# Patient Record
Sex: MALE | Race: BLACK OR AFRICAN AMERICAN | NOT HISPANIC OR LATINO | URBAN - METROPOLITAN AREA
[De-identification: names, ages, dates, MRNs, and addresses within clinical notes are randomized per-mention and may not be internally consistent; named-entity substitution may affect disease eponyms.]

---

## 2017-02-06 ENCOUNTER — EMERGENCY (EMERGENCY)
Facility: HOSPITAL | Age: 62
LOS: 0 days | Discharge: HOME | End: 2017-02-06
Attending: EMERGENCY MEDICINE
Payer: COMMERCIAL

## 2017-02-06 VITALS
OXYGEN SATURATION: 98 % | HEART RATE: 74 BPM | RESPIRATION RATE: 16 BRPM | TEMPERATURE: 98 F | DIASTOLIC BLOOD PRESSURE: 109 MMHG | SYSTOLIC BLOOD PRESSURE: 166 MMHG

## 2017-02-06 VITALS
DIASTOLIC BLOOD PRESSURE: 120 MMHG | WEIGHT: 130.07 LBS | TEMPERATURE: 98 F | OXYGEN SATURATION: 100 % | RESPIRATION RATE: 16 BRPM | HEART RATE: 85 BPM | SYSTOLIC BLOOD PRESSURE: 179 MMHG | HEIGHT: 65 IN

## 2017-02-06 DIAGNOSIS — E11.9 TYPE 2 DIABETES MELLITUS WITHOUT COMPLICATIONS: ICD-10-CM

## 2017-02-06 DIAGNOSIS — S13.9XXA SPRAIN OF JOINTS AND LIGAMENTS OF UNSPECIFIED PARTS OF NECK, INITIAL ENCOUNTER: ICD-10-CM

## 2017-02-06 DIAGNOSIS — I10 ESSENTIAL (PRIMARY) HYPERTENSION: ICD-10-CM
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
## 2017-02-06 DIAGNOSIS — R51 HEADACHE: ICD-10-CM
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
## 2017-02-06 DIAGNOSIS — M54.2 CERVICALGIA: ICD-10-CM

## 2017-02-06 PROCEDURE — 99283 EMERGENCY DEPT VISIT LOW MDM: CPT

## 2017-02-06 RX ORDER — IBUPROFEN 200 MG
1 TABLET ORAL
Qty: 12 | Refills: 1 | OUTPATIENT
Start: 2017-02-06

## 2017-02-06 RX ORDER — ACETAMINOPHEN 500 MG
975 TABLET ORAL ONCE
Qty: 0 | Refills: 0 | Status: COMPLETED | OUTPATIENT
Start: 2017-02-06 | End: 2017-02-06

## 2017-02-06 RX ORDER — IBUPROFEN 200 MG
600 TABLET ORAL ONCE
Qty: 0 | Refills: 0 | Status: COMPLETED | OUTPATIENT
Start: 2017-02-06 | End: 2017-02-06

## 2017-02-06 RX ORDER — LABETALOL HCL 100 MG
100 TABLET ORAL ONCE
Qty: 0 | Refills: 0 | Status: COMPLETED | OUTPATIENT
Start: 2017-02-06 | End: 2017-02-06

## 2017-02-06 RX ORDER — ACETAMINOPHEN 500 MG
2 TABLET ORAL
Qty: 24 | Refills: 1 | OUTPATIENT
Start: 2017-02-06

## 2017-02-06 RX ADMIN — Medication 975 MILLIGRAM(S): at 18:03

## 2017-02-06 RX ADMIN — Medication 600 MILLIGRAM(S): at 18:04

## 2017-02-06 RX ADMIN — Medication 100 MILLIGRAM(S): at 18:03
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
## 2017-02-06 NOTE — ED PROVIDER NOTE - MEDICAL DECISION MAKING DETAILS
Pt well appearing, no midline tenderness, only mild HA, no other complaints.  No imaging currently indicated in ED.  Given labetalol, motrin, tylenol.  Feeling better with improved BP.  f/u ortho.
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
## 2017-02-23 ENCOUNTER — APPOINTMENT (OUTPATIENT)
Dept: ENDOCRINOLOGY | Facility: CLINIC | Age: 62
End: 2017-02-23

## 2017-02-23 VITALS
OXYGEN SATURATION: 97 % | SYSTOLIC BLOOD PRESSURE: 150 MMHG | WEIGHT: 152 LBS | DIASTOLIC BLOOD PRESSURE: 90 MMHG | BODY MASS INDEX: 25.33 KG/M2 | HEIGHT: 65 IN | HEART RATE: 74 BPM

## 2017-02-23 LAB
GLUCOSE BLDC GLUCOMTR-MCNC: 207
HBA1C MFR BLD HPLC: 11.5

## 2017-02-24 LAB
25(OH)D3 SERPL-MCNC: 11.8 NG/ML
ANION GAP SERPL CALC-SCNC: 20 MMOL/L
BUN SERPL-MCNC: 13 MG/DL
CALCIUM SERPL-MCNC: 9.6 MG/DL
CHLORIDE SERPL-SCNC: 101 MMOL/L
CO2 SERPL-SCNC: 24 MMOL/L
CREAT SERPL-MCNC: 1.11 MG/DL
GLUCOSE SERPL-MCNC: 216 MG/DL
POTASSIUM SERPL-SCNC: 4.6 MMOL/L
SODIUM SERPL-SCNC: 144 MMOL/L

## 2017-04-07 ENCOUNTER — APPOINTMENT (OUTPATIENT)
Dept: ENDOCRINOLOGY | Facility: CLINIC | Age: 62
End: 2017-04-07

## 2017-05-01 ENCOUNTER — MEDICATION RENEWAL (OUTPATIENT)
Age: 62
End: 2017-05-01

## 2017-09-05 ENCOUNTER — APPOINTMENT (OUTPATIENT)
Dept: ENDOCRINOLOGY | Facility: CLINIC | Age: 62
End: 2017-09-05
Payer: COMMERCIAL

## 2017-09-05 VITALS
HEART RATE: 71 BPM | HEIGHT: 65 IN | BODY MASS INDEX: 24.32 KG/M2 | SYSTOLIC BLOOD PRESSURE: 140 MMHG | DIASTOLIC BLOOD PRESSURE: 84 MMHG | OXYGEN SATURATION: 98 % | WEIGHT: 146 LBS

## 2017-09-05 LAB
GLUCOSE BLDC GLUCOMTR-MCNC: 252
HBA1C MFR BLD HPLC: 13.6

## 2017-09-05 PROCEDURE — 83036 HEMOGLOBIN GLYCOSYLATED A1C: CPT | Mod: QW

## 2017-09-05 PROCEDURE — 82962 GLUCOSE BLOOD TEST: CPT

## 2017-09-05 PROCEDURE — 99215 OFFICE O/P EST HI 40 MIN: CPT | Mod: 25

## 2017-09-06 LAB
25(OH)D3 SERPL-MCNC: 20.8 NG/ML
ALBUMIN SERPL ELPH-MCNC: 4.6 G/DL
ALP BLD-CCNC: 128 U/L
ALT SERPL-CCNC: 19 U/L
ANION GAP SERPL CALC-SCNC: 16 MMOL/L
AST SERPL-CCNC: 19 U/L
BILIRUB SERPL-MCNC: 0.9 MG/DL
BUN SERPL-MCNC: 13 MG/DL
CALCIUM SERPL-MCNC: 10 MG/DL
CHLORIDE SERPL-SCNC: 95 MMOL/L
CHOLEST SERPL-MCNC: 210 MG/DL
CHOLEST/HDLC SERPL: 3.3 RATIO
CO2 SERPL-SCNC: 26 MMOL/L
CREAT SERPL-MCNC: 1.12 MG/DL
CREAT SPEC-SCNC: 143 MG/DL
GLUCOSE SERPL-MCNC: 246 MG/DL
HDLC SERPL-MCNC: 63 MG/DL
LDLC SERPL CALC-MCNC: 128 MG/DL
MICROALBUMIN 24H UR DL<=1MG/L-MCNC: 4.3 MG/DL
MICROALBUMIN/CREAT 24H UR-RTO: 30 MG/G
POTASSIUM SERPL-SCNC: 4.6 MMOL/L
PROT SERPL-MCNC: 7.5 G/DL
SODIUM SERPL-SCNC: 137 MMOL/L
TRIGL SERPL-MCNC: 97 MG/DL

## 2017-10-23 ENCOUNTER — APPOINTMENT (OUTPATIENT)
Dept: ENDOCRINOLOGY | Facility: CLINIC | Age: 62
End: 2017-10-23

## 2017-11-30 ENCOUNTER — RX RENEWAL (OUTPATIENT)
Age: 62
End: 2017-11-30

## 2017-12-12 ENCOUNTER — APPOINTMENT (OUTPATIENT)
Dept: ENDOCRINOLOGY | Facility: CLINIC | Age: 62
End: 2017-12-12
Payer: COMMERCIAL

## 2017-12-12 VITALS
HEIGHT: 65 IN | SYSTOLIC BLOOD PRESSURE: 130 MMHG | BODY MASS INDEX: 24.66 KG/M2 | WEIGHT: 148 LBS | DIASTOLIC BLOOD PRESSURE: 74 MMHG | OXYGEN SATURATION: 98 % | HEART RATE: 69 BPM

## 2017-12-12 PROCEDURE — 99215 OFFICE O/P EST HI 40 MIN: CPT

## 2017-12-12 PROCEDURE — 95250 CONT GLUC MNTR PHYS/QHP EQP: CPT

## 2018-01-31 ENCOUNTER — RX RENEWAL (OUTPATIENT)
Age: 63
End: 2018-01-31

## 2018-02-12 ENCOUNTER — APPOINTMENT (OUTPATIENT)
Dept: ENDOCRINOLOGY | Facility: CLINIC | Age: 63
End: 2018-02-12
Payer: COMMERCIAL

## 2018-02-12 PROCEDURE — 95251 CONT GLUC MNTR ANALYSIS I&R: CPT

## 2018-02-12 PROCEDURE — G0108 DIAB MANAGE TRN  PER INDIV: CPT

## 2018-03-26 ENCOUNTER — APPOINTMENT (OUTPATIENT)
Dept: ENDOCRINOLOGY | Facility: CLINIC | Age: 63
End: 2018-03-26
Payer: COMMERCIAL

## 2018-03-26 VITALS — WEIGHT: 150.4 LBS | BODY MASS INDEX: 25.03 KG/M2

## 2018-03-26 LAB
GLUCOSE BLDC GLUCOMTR-MCNC: 110
HBA1C MFR BLD HPLC: >14

## 2018-03-26 PROCEDURE — 83036 HEMOGLOBIN GLYCOSYLATED A1C: CPT | Mod: QW

## 2018-03-26 PROCEDURE — G0108 DIAB MANAGE TRN  PER INDIV: CPT

## 2018-03-26 PROCEDURE — 82962 GLUCOSE BLOOD TEST: CPT

## 2018-05-14 ENCOUNTER — APPOINTMENT (OUTPATIENT)
Dept: ENDOCRINOLOGY | Facility: CLINIC | Age: 63
End: 2018-05-14

## 2018-07-09 ENCOUNTER — RX RENEWAL (OUTPATIENT)
Age: 63
End: 2018-07-09

## 2018-07-12 ENCOUNTER — APPOINTMENT (OUTPATIENT)
Dept: ENDOCRINOLOGY | Facility: CLINIC | Age: 63
End: 2018-07-12
Payer: COMMERCIAL

## 2018-07-12 VITALS
BODY MASS INDEX: 24.66 KG/M2 | HEIGHT: 65 IN | OXYGEN SATURATION: 99 % | DIASTOLIC BLOOD PRESSURE: 70 MMHG | WEIGHT: 148 LBS | HEART RATE: 72 BPM | SYSTOLIC BLOOD PRESSURE: 122 MMHG

## 2018-07-12 LAB
GLUCOSE BLDC GLUCOMTR-MCNC: 103
GLUCOSE BLDC GLUCOMTR-MCNC: 52
GLUCOSE BLDC GLUCOMTR-MCNC: 57
HBA1C MFR BLD HPLC: 9.1

## 2018-07-12 PROCEDURE — 82962 GLUCOSE BLOOD TEST: CPT

## 2018-07-12 PROCEDURE — 99215 OFFICE O/P EST HI 40 MIN: CPT | Mod: 25

## 2018-07-12 PROCEDURE — 83036 HEMOGLOBIN GLYCOSYLATED A1C: CPT | Mod: QW

## 2018-07-23 LAB
25(OH)D3 SERPL-MCNC: 25 NG/ML
ALBUMIN SERPL ELPH-MCNC: 4.3 G/DL
ALP BLD-CCNC: 86 U/L
ALT SERPL-CCNC: 15 U/L
ANION GAP SERPL CALC-SCNC: 15 MMOL/L
AST SERPL-CCNC: 18 U/L
BILIRUB SERPL-MCNC: 1.2 MG/DL
BUN SERPL-MCNC: 18 MG/DL
CALCIUM SERPL-MCNC: 9.4 MG/DL
CHLORIDE SERPL-SCNC: 100 MMOL/L
CHOLEST SERPL-MCNC: 182 MG/DL
CHOLEST/HDLC SERPL: 3.1 RATIO
CO2 SERPL-SCNC: 28 MMOL/L
CREAT SERPL-MCNC: 1.02 MG/DL
CREAT SPEC-SCNC: 179 MG/DL
GLUCOSE SERPL-MCNC: 119 MG/DL
HDLC SERPL-MCNC: 58 MG/DL
LDLC SERPL CALC-MCNC: 106 MG/DL
MICROALBUMIN 24H UR DL<=1MG/L-MCNC: 1.5 MG/DL
MICROALBUMIN/CREAT 24H UR-RTO: 8 MG/G
POTASSIUM SERPL-SCNC: 4 MMOL/L
PROT SERPL-MCNC: 6.9 G/DL
SODIUM SERPL-SCNC: 143 MMOL/L
TRIGL SERPL-MCNC: 88 MG/DL
TSH SERPL-ACNC: 2.45 UIU/ML
VIT B12 SERPL-MCNC: 466 PG/ML

## 2018-10-16 ENCOUNTER — APPOINTMENT (OUTPATIENT)
Dept: ENDOCRINOLOGY | Facility: CLINIC | Age: 63
End: 2018-10-16
Payer: COMMERCIAL

## 2018-10-16 VITALS
DIASTOLIC BLOOD PRESSURE: 78 MMHG | HEIGHT: 65 IN | OXYGEN SATURATION: 98 % | HEART RATE: 79 BPM | BODY MASS INDEX: 25.99 KG/M2 | WEIGHT: 156 LBS | SYSTOLIC BLOOD PRESSURE: 140 MMHG

## 2018-10-16 LAB
GLUCOSE BLDC GLUCOMTR-MCNC: 326
HBA1C MFR BLD HPLC: 8

## 2018-10-16 PROCEDURE — 83036 HEMOGLOBIN GLYCOSYLATED A1C: CPT | Mod: QW

## 2018-10-16 PROCEDURE — 99215 OFFICE O/P EST HI 40 MIN: CPT | Mod: 25

## 2018-10-16 PROCEDURE — 82962 GLUCOSE BLOOD TEST: CPT

## 2018-10-16 PROCEDURE — 95251 CONT GLUC MNTR ANALYSIS I&R: CPT

## 2019-01-08 ENCOUNTER — INPATIENT (INPATIENT)
Facility: HOSPITAL | Age: 64
LOS: 0 days | Discharge: HOME | DRG: 247 | End: 2019-01-09
Attending: INTERNAL MEDICINE | Admitting: SPECIALIST
Payer: COMMERCIAL

## 2019-01-08 ENCOUNTER — TRANSCRIPTION ENCOUNTER (OUTPATIENT)
Age: 64
End: 2019-01-08

## 2019-01-08 VITALS
HEIGHT: 65 IN | SYSTOLIC BLOOD PRESSURE: 152 MMHG | RESPIRATION RATE: 16 BRPM | OXYGEN SATURATION: 97 % | DIASTOLIC BLOOD PRESSURE: 99 MMHG | TEMPERATURE: 98 F | WEIGHT: 149.91 LBS | HEART RATE: 86 BPM

## 2019-01-08 DIAGNOSIS — Z98.890 OTHER SPECIFIED POSTPROCEDURAL STATES: Chronic | ICD-10-CM

## 2019-01-08 DIAGNOSIS — R94.39 ABNORMAL RESULT OF OTHER CARDIOVASCULAR FUNCTION STUDY: ICD-10-CM

## 2019-01-08 LAB
ALBUMIN SERPL ELPH-MCNC: 4.5 G/DL — SIGNIFICANT CHANGE UP (ref 3.3–5)
ALP SERPL-CCNC: 128 U/L — HIGH (ref 40–120)
ALT FLD-CCNC: 32 U/L — SIGNIFICANT CHANGE UP (ref 10–45)
ANION GAP SERPL CALC-SCNC: 12 MMOL/L — SIGNIFICANT CHANGE UP (ref 5–17)
AST SERPL-CCNC: 17 U/L — SIGNIFICANT CHANGE UP (ref 10–40)
BILIRUB SERPL-MCNC: 1.3 MG/DL — HIGH (ref 0.2–1.2)
BUN SERPL-MCNC: 14 MG/DL — SIGNIFICANT CHANGE UP (ref 7–23)
CALCIUM SERPL-MCNC: 9.1 MG/DL — SIGNIFICANT CHANGE UP (ref 8.4–10.5)
CHLORIDE SERPL-SCNC: 105 MMOL/L — SIGNIFICANT CHANGE UP (ref 96–108)
CO2 SERPL-SCNC: 26 MMOL/L — SIGNIFICANT CHANGE UP (ref 22–31)
CREAT SERPL-MCNC: 0.88 MG/DL — SIGNIFICANT CHANGE UP (ref 0.5–1.3)
GLUCOSE BLDC GLUCOMTR-MCNC: 134 MG/DL — HIGH (ref 70–99)
GLUCOSE SERPL-MCNC: 175 MG/DL — HIGH (ref 70–99)
HCT VFR BLD CALC: 46 % — SIGNIFICANT CHANGE UP (ref 39–50)
HGB BLD-MCNC: 15.4 G/DL — SIGNIFICANT CHANGE UP (ref 13–17)
MCHC RBC-ENTMCNC: 28.7 PG — SIGNIFICANT CHANGE UP (ref 27–34)
MCHC RBC-ENTMCNC: 33.4 GM/DL — SIGNIFICANT CHANGE UP (ref 32–36)
MCV RBC AUTO: 85.8 FL — SIGNIFICANT CHANGE UP (ref 80–100)
PLATELET # BLD AUTO: 241 K/UL — SIGNIFICANT CHANGE UP (ref 150–400)
POTASSIUM SERPL-MCNC: 3.8 MMOL/L — SIGNIFICANT CHANGE UP (ref 3.5–5.3)
POTASSIUM SERPL-SCNC: 3.8 MMOL/L — SIGNIFICANT CHANGE UP (ref 3.5–5.3)
PROT SERPL-MCNC: 7.3 G/DL — SIGNIFICANT CHANGE UP (ref 6–8.3)
RBC # BLD: 5.35 M/UL — SIGNIFICANT CHANGE UP (ref 4.2–5.8)
RBC # FLD: 13.8 % — SIGNIFICANT CHANGE UP (ref 10.3–14.5)
SODIUM SERPL-SCNC: 143 MMOL/L — SIGNIFICANT CHANGE UP (ref 135–145)
WBC # BLD: 7.3 K/UL — SIGNIFICANT CHANGE UP (ref 3.8–10.5)
WBC # FLD AUTO: 7.3 K/UL — SIGNIFICANT CHANGE UP (ref 3.8–10.5)

## 2019-01-08 PROCEDURE — 93010 ELECTROCARDIOGRAM REPORT: CPT | Mod: 76

## 2019-01-08 PROCEDURE — 92928 PRQ TCAT PLMT NTRAC ST 1 LES: CPT | Mod: LC,GC

## 2019-01-08 PROCEDURE — 99152 MOD SED SAME PHYS/QHP 5/>YRS: CPT | Mod: GC

## 2019-01-08 RX ORDER — INSULIN NPH HUM/REG INSULIN HM 70-30/ML
20 VIAL (ML) SUBCUTANEOUS
Qty: 0 | Refills: 0 | COMMUNITY

## 2019-01-08 RX ORDER — AMLODIPINE BESYLATE 2.5 MG/1
10 TABLET ORAL DAILY
Qty: 0 | Refills: 0 | Status: DISCONTINUED | OUTPATIENT
Start: 2019-01-08 | End: 2019-01-09

## 2019-01-08 RX ORDER — ATORVASTATIN CALCIUM 80 MG/1
80 TABLET, FILM COATED ORAL AT BEDTIME
Qty: 0 | Refills: 0 | Status: DISCONTINUED | OUTPATIENT
Start: 2019-01-08 | End: 2019-01-09

## 2019-01-08 RX ORDER — FUROSEMIDE 40 MG
20 TABLET ORAL
Qty: 0 | Refills: 0 | COMMUNITY

## 2019-01-08 RX ORDER — FUROSEMIDE 40 MG
20 TABLET ORAL ONCE
Qty: 0 | Refills: 0 | Status: COMPLETED | OUTPATIENT
Start: 2019-01-08 | End: 2019-01-08

## 2019-01-08 RX ORDER — INSULIN DETEMIR 100/ML (3)
22 INSULIN PEN (ML) SUBCUTANEOUS
Qty: 0 | Refills: 0 | COMMUNITY

## 2019-01-08 RX ORDER — DEXTROSE 50 % IN WATER 50 %
25 SYRINGE (ML) INTRAVENOUS ONCE
Qty: 0 | Refills: 0 | Status: DISCONTINUED | OUTPATIENT
Start: 2019-01-08 | End: 2019-01-09

## 2019-01-08 RX ORDER — CLOPIDOGREL BISULFATE 75 MG/1
1 TABLET, FILM COATED ORAL
Qty: 30 | Refills: 11 | OUTPATIENT
Start: 2019-01-08 | End: 2020-01-02

## 2019-01-08 RX ORDER — DEXTROSE 50 % IN WATER 50 %
15 SYRINGE (ML) INTRAVENOUS ONCE
Qty: 0 | Refills: 0 | Status: DISCONTINUED | OUTPATIENT
Start: 2019-01-08 | End: 2019-01-09

## 2019-01-08 RX ORDER — ATORVASTATIN CALCIUM 80 MG/1
1 TABLET, FILM COATED ORAL
Qty: 0 | Refills: 0 | COMMUNITY
Start: 2019-01-08

## 2019-01-08 RX ORDER — AMLODIPINE AND VALSARTAN 5; 320 MG/1; MG/1
1 TABLET, FILM COATED ORAL
Qty: 0 | Refills: 0 | COMMUNITY

## 2019-01-08 RX ORDER — ATORVASTATIN CALCIUM 80 MG/1
1 TABLET, FILM COATED ORAL
Qty: 30 | Refills: 11 | OUTPATIENT
Start: 2019-01-08 | End: 2020-01-02

## 2019-01-08 RX ORDER — DEXTROSE 50 % IN WATER 50 %
12.5 SYRINGE (ML) INTRAVENOUS ONCE
Qty: 0 | Refills: 0 | Status: DISCONTINUED | OUTPATIENT
Start: 2019-01-08 | End: 2019-01-09

## 2019-01-08 RX ORDER — ISOSORBIDE MONONITRATE 60 MG/1
30 TABLET, EXTENDED RELEASE ORAL DAILY
Qty: 0 | Refills: 0 | Status: DISCONTINUED | OUTPATIENT
Start: 2019-01-08 | End: 2019-01-09

## 2019-01-08 RX ORDER — ASPIRIN/CALCIUM CARB/MAGNESIUM 324 MG
81 TABLET ORAL DAILY
Qty: 0 | Refills: 0 | Status: DISCONTINUED | OUTPATIENT
Start: 2019-01-08 | End: 2019-01-09

## 2019-01-08 RX ORDER — INSULIN LISPRO 100/ML
VIAL (ML) SUBCUTANEOUS AT BEDTIME
Qty: 0 | Refills: 0 | Status: DISCONTINUED | OUTPATIENT
Start: 2019-01-08 | End: 2019-01-09

## 2019-01-08 RX ORDER — VALSARTAN 80 MG/1
320 TABLET ORAL DAILY
Qty: 0 | Refills: 0 | Status: DISCONTINUED | OUTPATIENT
Start: 2019-01-08 | End: 2019-01-09

## 2019-01-08 RX ORDER — GLUCAGON INJECTION, SOLUTION 0.5 MG/.1ML
1 INJECTION, SOLUTION SUBCUTANEOUS ONCE
Qty: 0 | Refills: 0 | Status: DISCONTINUED | OUTPATIENT
Start: 2019-01-08 | End: 2019-01-09

## 2019-01-08 RX ORDER — CLOPIDOGREL BISULFATE 75 MG/1
75 TABLET, FILM COATED ORAL DAILY
Qty: 0 | Refills: 0 | Status: DISCONTINUED | OUTPATIENT
Start: 2019-01-08 | End: 2019-01-09

## 2019-01-08 RX ORDER — METFORMIN HYDROCHLORIDE 850 MG/1
1 TABLET ORAL
Qty: 0 | Refills: 0 | COMMUNITY

## 2019-01-08 RX ORDER — ISOSORBIDE MONONITRATE 60 MG/1
1 TABLET, EXTENDED RELEASE ORAL
Qty: 0 | Refills: 0 | COMMUNITY

## 2019-01-08 RX ORDER — CLOPIDOGREL BISULFATE 75 MG/1
1 TABLET, FILM COATED ORAL
Qty: 0 | Refills: 0 | COMMUNITY
Start: 2019-01-08

## 2019-01-08 RX ORDER — INSULIN NPH HUM/REG INSULIN HM 70-30/ML
16 VIAL (ML) SUBCUTANEOUS
Qty: 0 | Refills: 0 | COMMUNITY

## 2019-01-08 RX ORDER — ASPIRIN/CALCIUM CARB/MAGNESIUM 324 MG
1 TABLET ORAL
Qty: 0 | Refills: 0 | COMMUNITY

## 2019-01-08 RX ORDER — INSULIN LISPRO 100/ML
VIAL (ML) SUBCUTANEOUS
Qty: 0 | Refills: 0 | Status: DISCONTINUED | OUTPATIENT
Start: 2019-01-08 | End: 2019-01-09

## 2019-01-08 RX ORDER — SODIUM CHLORIDE 9 MG/ML
1000 INJECTION, SOLUTION INTRAVENOUS
Qty: 0 | Refills: 0 | Status: DISCONTINUED | OUTPATIENT
Start: 2019-01-08 | End: 2019-01-09

## 2019-01-08 RX ADMIN — ATORVASTATIN CALCIUM 80 MILLIGRAM(S): 80 TABLET, FILM COATED ORAL at 22:49

## 2019-01-08 RX ADMIN — ISOSORBIDE MONONITRATE 30 MILLIGRAM(S): 60 TABLET, EXTENDED RELEASE ORAL at 22:49

## 2019-01-08 RX ADMIN — Medication 20 MILLIGRAM(S): at 19:20

## 2019-01-08 NOTE — DISCHARGE NOTE ADULT - PATIENT PORTAL LINK FT
You can access the [facility] Patient Portal, offered by [facility], by registering with the following website: [email or web address]

## 2019-01-08 NOTE — DISCHARGE NOTE ADULT - CARE PROVIDER_API CALL
Jonathan Reagan), Internal Medicine  [address]  [address]  [place] [address]  Phone: [phone]  Fax: [phone]

## 2019-01-08 NOTE — DISCHARGE NOTE ADULT - NS AS ACTIVITY OBS
Walking-Indoors allowed/Walking-Outdoors allowed No Heavy lifting/straining/Walking-Outdoors allowed/Walking-Indoors allowed/Showering allowed

## 2019-01-08 NOTE — DISCHARGE NOTE ADULT - MEDICATION SUMMARY - MEDICATIONS TO TAKE
I will START or STAY ON the medications listed below when I get home from the hospital:    Ecotrin Adult Low Strength 81 mg oral delayed release tablet  -- 1 tab(s) by mouth once a day  -- Indication: For TO KEEP STENT OPEN     Imdur 30 mg oral tablet, extended release  -- 1 tab(s) by mouth once a day (in the morning)  -- Indication: For High blood pressure     NovoLIN 70/30 FlexPen subcutaneous suspension  -- 20 unit(s) subcutaneous once a day before breakfast  -- Indication: For Diabetes mellitus     NovoLIN 70/30 FlexPen subcutaneous suspension  -- 16 unit(s) subcutaneous once a day before dinner  -- Indication: For Diabetes mellitus     metFORMIN 1000 mg oral tablet  -- 1 tab(s) by mouth 2 times a day. RESTART ON 01/11/2019  -- Indication: For Diabetes mellitus     atorvastatin 80 mg oral tablet  -- 1 tab(s) by mouth once a day (at bedtime)  -- Indication: For High cholesterol     amlodipine-valsartan 10 mg-320 mg oral tablet  -- 1 tab(s) by mouth once a day  -- Indication: For High blood pressure     clopidogrel 75 mg oral tablet  -- 1 tab(s) by mouth once a day  -- Indication: For TO KEEP STENT OPEN     Lasix  -- 20 milligram(s) by mouth once a day  -- Indication: For edema I will START or STAY ON the medications listed below when I get home from the hospital:    Ecotrin Adult Low Strength 81 mg oral delayed release tablet  -- 1 tab(s) by mouth once a day  -- Indication: For TO KEEP STENT OPEN     Imdur 30 mg oral tablet, extended release  -- 1 tab(s) by mouth once a day (in the morning)  -- Indication: For High blood pressure CAD    NovoLIN 70/30 FlexPen subcutaneous suspension  -- 20 unit(s) subcutaneous once a day before breakfast  -- Indication: For Diabetes mellitus     NovoLIN 70/30 FlexPen subcutaneous suspension  -- 16 unit(s) subcutaneous once a day before dinner  -- Indication: For Diabetes mellitus     metFORMIN 1000 mg oral tablet  -- 1 tab(s) by mouth 2 times a day. RESTART ON 01/11/2019  -- Indication: For Diabetes mellitus     atorvastatin 80 mg oral tablet  -- 1 tab(s) by mouth once a day (at bedtime)  -- Indication: For High cholesterol     amlodipine-valsartan 10 mg-320 mg oral tablet  -- 1 tab(s) by mouth once a day  -- Indication: For High blood pressure     clopidogrel 75 mg oral tablet  -- 1 tab(s) by mouth once a day  -- Indication: For TO KEEP STENT OPEN     Lasix  -- 20 milligram(s) by mouth once a day  -- Indication: For edema

## 2019-01-08 NOTE — DISCHARGE NOTE ADULT - PLAN OF CARE
Pt remains chest pain free and understands post cath discharge instructions No heavy lifting, strenuous activity, bending, straining or unnecessary stair climbing  for 2 weeks. No sex for 1 week.  No driving for 2 days. You may shower 24 hours following procedure but avoid baths and swimming for 1 week. Check groin site for bleeding and/or swelling daily following procedure. Call your doctor/cardiologist immediately should it occur or if you have increased/persistent pain at the site. Follow up with your cardiologist in 1- 2 weeks. You may call [facility] at [phone] or [phone] after office hours and weekends  with any questions or concerns following your procedure. Take medications as prescribed. Your hemoglobin A1C will be between 7-8 Continue to follow with your primary care MD or your endocrinologist.  Follow a heart healthy diabetic diet. If you check your fingerstick glucose at home, call your MD if it is greater than 250mg/dL on 2 occasions or less than 100mg/dL on 2 occasions. Know signs of low blood sugar, such as: dizziness, shakiness, sweating, confusion, hunger, nervousness-drink 4 ounces apple juice if occurs and call your doctor. Know early signs of high blood sugar, such as: frequent urination, increased thirst, blurry vision, fatigue, headache - call your doctor if this occurs. Follow with other practitioners to care for your diabetes, such as ophthamologist and podiatrist. Your blood pressure will be controlled. Continue with your blood pressure medications; eat a heart healthy diet with low salt diet; exercise regularly (consult with your physician or cardiologist first); maintain a heart healthy weight; if you smoke - quit (A resource to help you stop smoking is the [facility] – phone number [phone].); include healthy ways to manage stress. Continue to follow with your primary care physician or cardiologist.

## 2019-01-08 NOTE — DISCHARGE NOTE ADULT - HOSPITAL COURSE
63 year old Black male h/o HTN, T2DM (A1C: 9.0) Dr Jaime is endocrinologist, angina presents with  BHAGAT and chest pain at rest. Pt underwent pharm stress test that revealed anterior hypokinesis, EF 40%. Pt is now s/p cardiac cath AROLDO x 1 Circ via right femoral artery access. Pt tolerated the procedure well, cardiac cath site benign. Post-procedure discharge instructions discussed and questions addressed 63 year old Black male h/o HTN, T2DM (A1C: 9.0) Dr Jaime is endocrinologist, angina presents with  BHAGAT and chest pain at rest. Pt underwent pharm stress test that revealed anterior hypokinesis, EF 40%. Pt is now s/p cardiac cath AROLDO x 1 Circ via right femoral artery access. Pt tolerated the procedure well, cardiac cath site benign. Post-procedure discharge instructions discussed and questions addressed. Pt is now s/p cardiac cath AROLDO x 1 circ via right radial artery access. Pt tolerated the procedure well, cath site benign. Post-procedure discharge instructions discussed and questions addressed. 63 year old Black male h/o HTN, T2DM (A1C: 9.0) Dr Jaime is endocrinologist, angina presents with  BHAGAT and chest pain at rest. Pt underwent pharm stress test that revealed anterior hypokinesis, EF 40%. Pt is now s/p cardiac cath AROLDO x 1 Circ via right femoral artery access. Pt tolerated the procedure well, cardiac cath site benign. Post-procedure discharge instructions discussed and questions addressed.      Hgb  Aic 9.9 - patient states it has decreased from 13.0. Is under care of Dr Addison BROWN and changes were made to insulin at last visit 2 months ago. Patient has follow up apt in 2 weeks and declined being seen by house ENDO stating he would prefer to follow up with own ENDO.   Teaching done and questions answered  seen by Dr Vega and cleared for discharge to home  ambulating and pain free 63 year old Black male h/o HTN, T2DM (A1C: 9.0) Dr Jaime is endocrinologist, angina presents with  BHAGAT and chest pain at rest. Pt underwent pharm stress test that revealed anterior hypokinesis, EF 40%. Pt is now s/p cardiac cath AROLDO x 1 Circ via right femoral artery access. Pt tolerated the procedure well, cardiac cath site benign. Post-procedure discharge instructions discussed and questions addressed.      Hgb  Aic 9.9 - patient states it has decreased from 13.0. Is under care of Dr Addison BROWN and changes were made to insulin at last visit 2 months ago. Patient has follow up apt in 2 weeks and declined being seen by house ENDO stating he would prefer to follow up with own ENDO.   Teaching done and questions answered  seen by Dr Vega and cleared for discharge to home  ambulating and pain free    < from: Transthoracic Echocardiogram (01.09.19 @ 12:16) >  -----------------------  Conclusions:  1. Normal left ventricular internal dimensions and wall  thicknesses.  2. Moderate segmental left ventricular systolic  dysfunction. The basal to mid inferior and inferolateral  walls are hypokinetic.  3. Mild diastolic dysfunction (Stage I).  4. Normal right ventricular size and function.  5. Estimated pulmonary artery systolic pressure equals 29  mm Hg, assuming right atrial pressure equals 8  mm Hg,  consistent with normal pulmonary pressures.  *** No previous Echo exam.  --------------------------------------------    < end of copied text >

## 2019-01-08 NOTE — H&P CARDIOLOGY - HISTORY OF PRESENT ILLNESS
63 year old Black male h/o HTN, T2DM (A1C:    controlled:   complications: managed by:    ), angina who was c/o BHAGAT and chest pain at rest. Pt underwent pharm stress test that revealed anterior hypokinesis, EF 40%. Pt presents for [facility] today. 63 year old Black male h/o HTN, T2DM (A1C: 9.0, not well controlled, complications: none, managed by: Dr Jaime:jag), angina who was c/o BHAGAT and chest pain at rest. Pt underwent pharm stress test that revealed anterior hypokinesis, EF 40%. Pt presents for [facility] today.

## 2019-01-08 NOTE — DISCHARGE NOTE ADULT - CARE PLAN
Principal Discharge DX:	CAD (coronary artery disease)  Goal:	Pt remains chest pain free and understands post cath discharge instructions  Assessment and plan of treatment:	No heavy lifting, strenuous activity, bending, straining or unnecessary stair climbing  for 2 weeks. No sex for 1 week.  No driving for 2 days. You may shower 24 hours following procedure but avoid baths and swimming for 1 week. Check groin site for bleeding and/or swelling daily following procedure. Call your doctor/cardiologist immediately should it occur or if you have increased/persistent pain at the site. Follow up with your cardiologist in 1- 2 weeks. You may call [facility] at [phone] or [phone] after office hours and weekends  with any questions or concerns following your procedure. Take medications as prescribed.  Secondary Diagnosis:	Diabetes  Goal:	Your hemoglobin A1C will be between 7-8  Assessment and plan of treatment:	Continue to follow with your primary care MD or your endocrinologist.  Follow a heart healthy diabetic diet. If you check your fingerstick glucose at home, call your MD if it is greater than 250mg/dL on 2 occasions or less than 100mg/dL on 2 occasions. Know signs of low blood sugar, such as: dizziness, shakiness, sweating, confusion, hunger, nervousness-drink 4 ounces apple juice if occurs and call your doctor. Know early signs of high blood sugar, such as: frequent urination, increased thirst, blurry vision, fatigue, headache - call your doctor if this occurs. Follow with other practitioners to care for your diabetes, such as ophthamologist and podiatrist.  Secondary Diagnosis:	HTN (hypertension)  Goal:	Your blood pressure will be controlled.  Assessment and plan of treatment:	Continue with your blood pressure medications; eat a heart healthy diet with low salt diet; exercise regularly (consult with your physician or cardiologist first); maintain a heart healthy weight; if you smoke - quit (A resource to help you stop smoking is the [facility] – phone number [phone].); include healthy ways to manage stress. Continue to follow with your primary care physician or cardiologist.

## 2019-01-09 VITALS
HEART RATE: 78 BPM | TEMPERATURE: 99 F | OXYGEN SATURATION: 98 % | DIASTOLIC BLOOD PRESSURE: 95 MMHG | RESPIRATION RATE: 17 BRPM | SYSTOLIC BLOOD PRESSURE: 143 MMHG

## 2019-01-09 LAB
ANION GAP SERPL CALC-SCNC: 14 MMOL/L — SIGNIFICANT CHANGE UP (ref 5–17)
BUN SERPL-MCNC: 13 MG/DL — SIGNIFICANT CHANGE UP (ref 7–23)
CALCIUM SERPL-MCNC: 8.6 MG/DL — SIGNIFICANT CHANGE UP (ref 8.4–10.5)
CHLORIDE SERPL-SCNC: 100 MMOL/L — SIGNIFICANT CHANGE UP (ref 96–108)
CO2 SERPL-SCNC: 25 MMOL/L — SIGNIFICANT CHANGE UP (ref 22–31)
CREAT SERPL-MCNC: 0.9 MG/DL — SIGNIFICANT CHANGE UP (ref 0.5–1.3)
GLUCOSE BLDC GLUCOMTR-MCNC: 229 MG/DL — HIGH (ref 70–99)
GLUCOSE BLDC GLUCOMTR-MCNC: 255 MG/DL — HIGH (ref 70–99)
GLUCOSE SERPL-MCNC: 258 MG/DL — HIGH (ref 70–99)
HBA1C BLD-MCNC: 9.9 % — HIGH (ref 4–5.6)
HCT VFR BLD CALC: 43.6 % — SIGNIFICANT CHANGE UP (ref 39–50)
HGB BLD-MCNC: 14.7 G/DL — SIGNIFICANT CHANGE UP (ref 13–17)
MCHC RBC-ENTMCNC: 28.7 PG — SIGNIFICANT CHANGE UP (ref 27–34)
MCHC RBC-ENTMCNC: 33.8 GM/DL — SIGNIFICANT CHANGE UP (ref 32–36)
MCV RBC AUTO: 85.1 FL — SIGNIFICANT CHANGE UP (ref 80–100)
PLATELET # BLD AUTO: 206 K/UL — SIGNIFICANT CHANGE UP (ref 150–400)
POTASSIUM SERPL-MCNC: 3.7 MMOL/L — SIGNIFICANT CHANGE UP (ref 3.5–5.3)
POTASSIUM SERPL-SCNC: 3.7 MMOL/L — SIGNIFICANT CHANGE UP (ref 3.5–5.3)
RBC # BLD: 5.12 M/UL — SIGNIFICANT CHANGE UP (ref 4.2–5.8)
RBC # FLD: 13.2 % — SIGNIFICANT CHANGE UP (ref 10.3–14.5)
SODIUM SERPL-SCNC: 139 MMOL/L — SIGNIFICANT CHANGE UP (ref 135–145)
WBC # BLD: 6.4 K/UL — SIGNIFICANT CHANGE UP (ref 3.8–10.5)
WBC # FLD AUTO: 6.4 K/UL — SIGNIFICANT CHANGE UP (ref 3.8–10.5)

## 2019-01-09 PROCEDURE — 83036 HEMOGLOBIN GLYCOSYLATED A1C: CPT

## 2019-01-09 PROCEDURE — 82962 GLUCOSE BLOOD TEST: CPT

## 2019-01-09 PROCEDURE — C1769: CPT

## 2019-01-09 PROCEDURE — 99152 MOD SED SAME PHYS/QHP 5/>YRS: CPT

## 2019-01-09 PROCEDURE — C9600: CPT | Mod: LC

## 2019-01-09 PROCEDURE — 80053 COMPREHEN METABOLIC PANEL: CPT

## 2019-01-09 PROCEDURE — 85027 COMPLETE CBC AUTOMATED: CPT

## 2019-01-09 PROCEDURE — C1887: CPT

## 2019-01-09 PROCEDURE — 93005 ELECTROCARDIOGRAM TRACING: CPT

## 2019-01-09 PROCEDURE — 93306 TTE W/DOPPLER COMPLETE: CPT | Mod: 26

## 2019-01-09 PROCEDURE — C1874: CPT

## 2019-01-09 PROCEDURE — 99153 MOD SED SAME PHYS/QHP EA: CPT

## 2019-01-09 PROCEDURE — C1894: CPT

## 2019-01-09 PROCEDURE — 93460 R&L HRT ART/VENTRICLE ANGIO: CPT | Mod: 59

## 2019-01-09 PROCEDURE — 93306 TTE W/DOPPLER COMPLETE: CPT

## 2019-01-09 PROCEDURE — 80048 BASIC METABOLIC PNL TOTAL CA: CPT

## 2019-01-09 RX ORDER — ACETAMINOPHEN 500 MG
650 TABLET ORAL ONCE
Qty: 0 | Refills: 0 | Status: COMPLETED | OUTPATIENT
Start: 2019-01-09 | End: 2019-01-09

## 2019-01-09 RX ADMIN — Medication 650 MILLIGRAM(S): at 05:24

## 2019-01-09 RX ADMIN — VALSARTAN 320 MILLIGRAM(S): 80 TABLET ORAL at 05:24

## 2019-01-09 RX ADMIN — CLOPIDOGREL BISULFATE 75 MILLIGRAM(S): 75 TABLET, FILM COATED ORAL at 05:24

## 2019-01-09 RX ADMIN — Medication 81 MILLIGRAM(S): at 05:24

## 2019-01-09 RX ADMIN — AMLODIPINE BESYLATE 10 MILLIGRAM(S): 2.5 TABLET ORAL at 05:24

## 2019-01-09 RX ADMIN — Medication 2: at 07:51

## 2019-01-09 RX ADMIN — Medication 650 MILLIGRAM(S): at 05:54

## 2019-01-09 RX ADMIN — Medication 3: at 11:46

## 2019-01-09 RX ADMIN — ISOSORBIDE MONONITRATE 30 MILLIGRAM(S): 60 TABLET, EXTENDED RELEASE ORAL at 11:46

## 2019-01-09 NOTE — PROGRESS NOTE ADULT - SUBJECTIVE AND OBJECTIVE BOX
GERSON GARBER  63y Male  MRN:[id number]    Patient is a 63y old  Male who presents with a chief complaint of CAD (09 Jan 2019 02:08)    HPI:  63 year old Black male h/o HTN, T2DM (A1C: 9.0, not well controlled, complications: none, managed by: Dr Jaime:jag), angina who was c/o BHAGAT and chest pain at rest. Pt underwent pharm stress test that revealed anterior hypokinesis, EF 40%. Pt presents for [facility] today. (08 Jan 2019 12:45)      Patient seen and evaluated at bedside. No acute events overnight except as noted.    Interval HPI: s/p cath with pci    PAST MEDICAL & SURGICAL HISTORY:  Diabetes  Angina pectoris  HTN (hypertension)  S/P wrist surgery: left with hardware s/p fracture      REVIEW OF SYSTEMS:  as per hpi    VITALS:  Vital Signs Last 24 Hrs  T(C): 36.7 (09 Jan 2019 05:23), Max: 36.7 (08 Jan 2019 19:25)  T(F): 98 (09 Jan 2019 05:23), Max: 98.1 (08 Jan 2019 19:25)  HR: 74 (09 Jan 2019 11:47) (62 - 89)  BP: 114/61 (09 Jan 2019 11:47) (101/59 - 156/82)  BP(mean): 116 (08 Jan 2019 12:45) (116 - 116)  RR: 16 (09 Jan 2019 11:47) (15 - 18)  SpO2: 98% (09 Jan 2019 11:47) (94% - 100%)  CAPILLARY BLOOD GLUCOSE      POCT Blood Glucose.: 255 mg/dL (09 Jan 2019 11:05)  POCT Blood Glucose.: 229 mg/dL (09 Jan 2019 07:08)  POCT Blood Glucose.: 134 mg/dL (08 Jan 2019 20:11)    I&O's Summary    08 Jan 2019 07:01  -  09 Jan 2019 07:00  --------------------------------------------------------  IN: 120 mL / OUT: 1550 mL / NET: -1430 mL        PHYSICAL EXAM:  GENERAL: NAD, well-developed  HEAD:  Atraumatic, Normocephalic  EYES: EOMI, PERRLA, conjunctiva and sclera clear  NECK: Supple, No JVD  CHEST/LUNG: Clear to auscultation bilaterally; No wheeze  HEART: S1, S2; No murmurs, rubs, or gallops  ABDOMEN: Soft, Nontender, Nondistended; Bowel sounds present  EXTREMITIES:  2+ Peripheral Pulses, No clubbing, cyanosis, or edema  PSYCH: Normal affect  NEUROLOGY: AAOX3; non-focal  SKIN: No rashes or lesions    Consultant(s) Notes Reviewed:  [x ] YES  [ ] NO  Care Discussed with Consultants/Other Providers [ x] YES  [ ] NO    MEDS:  MEDICATIONS  (STANDING):  amLODIPine   Tablet 10 milliGRAM(s) Oral daily  aspirin enteric coated 81 milliGRAM(s) Oral daily  atorvastatin 80 milliGRAM(s) Oral at bedtime  clopidogrel Tablet 75 milliGRAM(s) Oral daily  dextrose 5%. 1000 milliLiter(s) (50 mL/Hr) IV Continuous <Continuous>  dextrose 50% Injectable 12.5 Gram(s) IV Push once  dextrose 50% Injectable 25 Gram(s) IV Push once  dextrose 50% Injectable 25 Gram(s) IV Push once  insulin lispro (HumaLOG) corrective regimen sliding scale   SubCutaneous three times a day before meals  insulin lispro (HumaLOG) corrective regimen sliding scale   SubCutaneous at bedtime  isosorbide   mononitrate ER Tablet (IMDUR) 30 milliGRAM(s) Oral daily  valsartan 320 milliGRAM(s) Oral daily    MEDICATIONS  (PRN):  dextrose 40% Gel 15 Gram(s) Oral once PRN Blood Glucose LESS THAN 70 milliGRAM(s)/deciliter  glucagon  Injectable 1 milliGRAM(s) IntraMuscular once PRN Glucose LESS THAN 70 milligrams/deciliter    ALLERGIES:  No Known Allergies      LABS:                        14.7   6.4   )-----------( 206      ( 09 Jan 2019 00:28 )             43.6     01-09    139  |  100  |  13  ----------------------------<  258<H>  3.7   |  25  |  0.90    Ca    8.6      09 Jan 2019 00:28    TPro  7.3  /  Alb  4.5  /  TBili  1.3<H>  /  DBili  x   /  AST  17  /  ALT  32  /  AlkPhos  128<H>  01-08          LIVER FUNCTIONS - ( 08 Jan 2019 13:02 )  Alb: 4.5 g/dL / Pro: 7.3 g/dL / ALK PHOS: 128 U/L / ALT: 32 U/L / AST: 17 U/L / GGT: x             TSH:   A1c:Hemoglobin A1C, Whole Blood: 9.9 % (01-09 @ 02:48)

## 2019-01-09 NOTE — PROGRESS NOTE ADULT - ASSESSMENT
62 yo male h/o dm, htn, a/w abnl stress test    s/p cath with pci to circ  asa/plavix/statin/bb  cards f/u  f/u echo    dm  uncontrolled on insulin  to f/u with endo as outpt    cont other home meds    dc planning

## 2019-01-09 NOTE — PROGRESS NOTE ADULT - SUBJECTIVE AND OBJECTIVE BOX
63y old  Male who presents with chest pain  (2019 21:21) now s/p cardiac cath AROLDO x 1 Circ  via right femoral artery access          Allergies    No Known Allergies    Intolerances        Medications:  amLODIPine   Tablet 10 milliGRAM(s) Oral daily  aspirin enteric coated 81 milliGRAM(s) Oral daily  atorvastatin 80 milliGRAM(s) Oral at bedtime  clopidogrel Tablet 75 milliGRAM(s) Oral daily  dextrose 40% Gel 15 Gram(s) Oral once PRN  dextrose 5%. 1000 milliLiter(s) IV Continuous <Continuous>  dextrose 50% Injectable 12.5 Gram(s) IV Push once  dextrose 50% Injectable 25 Gram(s) IV Push once  dextrose 50% Injectable 25 Gram(s) IV Push once  glucagon  Injectable 1 milliGRAM(s) IntraMuscular once PRN  insulin lispro (HumaLOG) corrective regimen sliding scale   SubCutaneous three times a day before meals  insulin lispro (HumaLOG) corrective regimen sliding scale   SubCutaneous at bedtime  isosorbide   mononitrate ER Tablet (IMDUR) 30 milliGRAM(s) Oral daily  valsartan 320 milliGRAM(s) Oral daily      Vitals:  T(C): 36.7 (19 @ 19:25), Max: 36.7 (19 @ 19:25)  HR: 62 (19 @ 01:45) (62 - 89)  BP: 101/59 (19 @ 01:45) (101/59 - 156/82)  BP(mean): 116 (19 @ 12:45) (116 - 116)  RR: 15 (19 @ 01:45) (15 - 18)  SpO2: 95% (19 @ 01:45) (95% - 100%)  Wt(kg): --  Daily Height in cm: 165.1 (2019 12:45)    Daily Weight in k (2019 12:45)  I&O's Summary    2019 07:01  -  2019 02:08  --------------------------------------------------------  IN: 0 mL / OUT: 1550 mL / NET: -1550 mL          Physical Exam:  Appearance: Normal  Eyes: PERRL, EOMI  HENT: Normal oral mus[place], NC/AT  Cardiovascular: S1S2, RRR, No M/R/G, no JVD, No Lower extremity edema  Procedural Access Site: Right femoral artery access. No hematoma, Non-tender to palpation, 2+ pulse, No bruit, No Ecchymosis  Respiratory: Clear to auscultation bilaterally  Gastrointestinal: Soft, Non tender, Normal Bowel Sounds  Musculoskeletal: No clubbing, No joint deformity   Neurologic: Non-focal  Psychiatry: AAOx3, Mood & affect appropriate  Skin: No rashes, No ecchymoses, No cyanosis        139  |  100  |  13  ----------------------------<  258<H>  3.7   |  25  |  0.90    Ca    8.6      2019 00:28    TPro  7.3  /  Alb  4.5  /  TBili  1.3<H>  /  DBili  x   /  AST  17  /  ALT  32  /  AlkPhos  128<H>        Interpretation of Telemetry:      ASSESSMENT/PLAN   63y old  Male who presents with chest pain  (2019 21:21) now s/p cardiac cath AROLDO x 1 Circ  via right femoral artery access. Pt tolerated the procedure well, cardiac cath site benign. Post-procedure discharge instructions discussed and questions addressed 63y old  Male who presents with chest pain  (2019 21:21) now s/p cardiac cath AROLDO x 1 Circ  via right femoral artery access          Allergies    No Known Allergies    Intolerances        Medications:  amLODIPine   Tablet 10 milliGRAM(s) Oral daily  aspirin enteric coated 81 milliGRAM(s) Oral daily  atorvastatin 80 milliGRAM(s) Oral at bedtime  clopidogrel Tablet 75 milliGRAM(s) Oral daily  dextrose 40% Gel 15 Gram(s) Oral once PRN  dextrose 5%. 1000 milliLiter(s) IV Continuous <Continuous>  dextrose 50% Injectable 12.5 Gram(s) IV Push once  dextrose 50% Injectable 25 Gram(s) IV Push once  dextrose 50% Injectable 25 Gram(s) IV Push once  glucagon  Injectable 1 milliGRAM(s) IntraMuscular once PRN  insulin lispro (HumaLOG) corrective regimen sliding scale   SubCutaneous three times a day before meals  insulin lispro (HumaLOG) corrective regimen sliding scale   SubCutaneous at bedtime  isosorbide   mononitrate ER Tablet (IMDUR) 30 milliGRAM(s) Oral daily  valsartan 320 milliGRAM(s) Oral daily      Vitals:  T(C): 36.7 (19 @ 19:25), Max: 36.7 (19 @ 19:25)  HR: 62 (19 @ 01:45) (62 - 89)  BP: 101/59 (19 @ 01:45) (101/59 - 156/82)  BP(mean): 116 (19 @ 12:45) (116 - 116)  RR: 15 (19 @ 01:45) (15 - 18)  SpO2: 95% (19 @ 01:45) (95% - 100%)  Wt(kg): --  Daily Height in cm: 165.1 (2019 12:45)    Daily Weight in k (2019 12:45)  I&O's Summary    2019 07:01  -  2019 02:08  --------------------------------------------------------  IN: 0 mL / OUT: 1550 mL / NET: -1550 mL          Physical Exam:  Appearance: Normal  Eyes: PERRL, EOMI  HENT: Normal oral mus[place]a, NC/AT  Cardiovascular: S1S2, RRR, No M/R/G, no JVD, No Lower extremity edema  Procedural Access Site: Right femoral artery access. No hematoma, Non-tender to palpation, 2+ pulse, No bruit, No Ecchymosis  Respiratory: Clear to auscultation bilaterally  Gastrointestinal: Soft, Non tender, Normal Bowel Sounds  Musculoskeletal: No clubbing, No joint deformity   Neurologic: Non-focal  Psychiatry: AAOx3, Mood & affect appropriate  Skin: No rashes, No ecchymoses, No cyanosis        139  |  100  |  13  ----------------------------<  258<H>  3.7   |  25  |  0.90    Ca    8.6      2019 00:28    TPro  7.3  /  Alb  4.5  /  TBili  1.3<H>  /  DBili  x   /  AST  17  /  ALT  32  /  AlkPhos  128<H>        Interpretation of Telemetry: SR 60-70bpm       ASSESSMENT/PLAN   63y old  Male who presents with chest pain  (2019 21:21) now s/p cardiac cath AROLDO x 1 Circ  via right femoral artery access. Pt tolerated the procedure well, cardiac cath site benign. Post-procedure discharge instructions discussed and questions addressed

## 2019-01-22 ENCOUNTER — APPOINTMENT (OUTPATIENT)
Dept: ENDOCRINOLOGY | Facility: CLINIC | Age: 64
End: 2019-01-22
Payer: COMMERCIAL

## 2019-01-22 VITALS
OXYGEN SATURATION: 98 % | WEIGHT: 151 LBS | HEIGHT: 65 IN | SYSTOLIC BLOOD PRESSURE: 132 MMHG | BODY MASS INDEX: 25.16 KG/M2 | HEART RATE: 78 BPM | DIASTOLIC BLOOD PRESSURE: 80 MMHG

## 2019-01-22 PROBLEM — E11.9 TYPE 2 DIABETES MELLITUS WITHOUT COMPLICATIONS: Chronic | Status: ACTIVE | Noted: 2019-01-08

## 2019-01-22 PROBLEM — I20.9 ANGINA PECTORIS, UNSPECIFIED: Chronic | Status: ACTIVE | Noted: 2019-01-08

## 2019-01-22 PROBLEM — I10 ESSENTIAL (PRIMARY) HYPERTENSION: Chronic | Status: ACTIVE | Noted: 2019-01-08

## 2019-01-22 LAB
GLUCOSE BLDC GLUCOMTR-MCNC: 102
HBA1C MFR BLD HPLC: 9.1

## 2019-01-22 PROCEDURE — 99215 OFFICE O/P EST HI 40 MIN: CPT | Mod: 25

## 2019-01-22 PROCEDURE — 82962 GLUCOSE BLOOD TEST: CPT

## 2019-01-22 PROCEDURE — 83036 HEMOGLOBIN GLYCOSYLATED A1C: CPT | Mod: QW

## 2019-01-22 PROCEDURE — G0008: CPT

## 2019-01-22 PROCEDURE — 90686 IIV4 VACC NO PRSV 0.5 ML IM: CPT

## 2019-01-22 PROCEDURE — 95251 CONT GLUC MNTR ANALYSIS I&R: CPT

## 2019-01-22 RX ORDER — ISOSORBIDE MONONITRATE 30 MG/1
30 TABLET, EXTENDED RELEASE ORAL
Refills: 0 | Status: ACTIVE | COMMUNITY

## 2019-01-22 RX ORDER — CLOPIDOGREL BISULFATE 75 MG/1
75 TABLET, FILM COATED ORAL
Refills: 0 | Status: ACTIVE | COMMUNITY

## 2019-01-22 RX ORDER — ASPIRIN ENTERIC COATED TABLETS 81 MG 81 MG/1
81 TABLET, DELAYED RELEASE ORAL
Refills: 0 | Status: ACTIVE | COMMUNITY

## 2019-01-22 RX ORDER — INSULIN ASPART 100 [IU]/ML
(70-30) 100 INJECTION, SUSPENSION SUBCUTANEOUS
Qty: 45 | Refills: 3 | Status: DISCONTINUED | COMMUNITY
Start: 2018-02-12 | End: 2019-01-22

## 2019-01-22 RX ORDER — FUROSEMIDE 20 MG/1
20 TABLET ORAL
Refills: 0 | Status: ACTIVE | COMMUNITY

## 2019-01-24 ENCOUNTER — RX RENEWAL (OUTPATIENT)
Age: 64
End: 2019-01-24

## 2019-02-19 ENCOUNTER — RX RENEWAL (OUTPATIENT)
Age: 64
End: 2019-02-19

## 2019-02-19 ENCOUNTER — APPOINTMENT (OUTPATIENT)
Dept: ENDOCRINOLOGY | Facility: CLINIC | Age: 64
End: 2019-02-19

## 2019-04-16 ENCOUNTER — APPOINTMENT (OUTPATIENT)
Dept: ENDOCRINOLOGY | Facility: CLINIC | Age: 64
End: 2019-04-16
Payer: COMMERCIAL

## 2019-04-16 VITALS
SYSTOLIC BLOOD PRESSURE: 150 MMHG | BODY MASS INDEX: 24.99 KG/M2 | HEART RATE: 80 BPM | WEIGHT: 150 LBS | HEIGHT: 65 IN | OXYGEN SATURATION: 98 % | DIASTOLIC BLOOD PRESSURE: 80 MMHG

## 2019-04-16 PROCEDURE — 99214 OFFICE O/P EST MOD 30 MIN: CPT | Mod: 25

## 2019-04-16 PROCEDURE — 95251 CONT GLUC MNTR ANALYSIS I&R: CPT

## 2019-07-16 ENCOUNTER — APPOINTMENT (OUTPATIENT)
Dept: ENDOCRINOLOGY | Facility: CLINIC | Age: 64
End: 2019-07-16
Payer: COMMERCIAL

## 2019-07-16 VITALS
SYSTOLIC BLOOD PRESSURE: 140 MMHG | DIASTOLIC BLOOD PRESSURE: 90 MMHG | HEIGHT: 65 IN | BODY MASS INDEX: 24.66 KG/M2 | WEIGHT: 148 LBS | OXYGEN SATURATION: 98 % | HEART RATE: 64 BPM

## 2019-07-16 PROCEDURE — 95251 CONT GLUC MNTR ANALYSIS I&R: CPT

## 2019-07-16 PROCEDURE — 99214 OFFICE O/P EST MOD 30 MIN: CPT | Mod: 25

## 2019-07-16 PROCEDURE — 83036 HEMOGLOBIN GLYCOSYLATED A1C: CPT | Mod: QW

## 2019-07-16 PROCEDURE — 82962 GLUCOSE BLOOD TEST: CPT

## 2019-07-17 ENCOUNTER — CLINICAL ADVICE (OUTPATIENT)
Age: 64
End: 2019-07-17

## 2019-07-17 LAB
CREAT SPEC-SCNC: 219 MG/DL
GLUCOSE BLDC GLUCOMTR-MCNC: 115
HBA1C MFR BLD HPLC: 10.5
MICROALBUMIN 24H UR DL<=1MG/L-MCNC: 1.5 MG/DL
MICROALBUMIN/CREAT 24H UR-RTO: 7 MG/G

## 2019-07-18 ENCOUNTER — MEDICATION RENEWAL (OUTPATIENT)
Age: 64
End: 2019-07-18

## 2019-10-29 ENCOUNTER — APPOINTMENT (OUTPATIENT)
Dept: ENDOCRINOLOGY | Facility: CLINIC | Age: 64
End: 2019-10-29

## 2020-02-19 ENCOUNTER — RX RENEWAL (OUTPATIENT)
Age: 65
End: 2020-02-19

## 2020-02-19 RX ORDER — FLASH GLUCOSE SCANNING READER
EACH MISCELLANEOUS
Qty: 1 | Refills: 0 | Status: ACTIVE | COMMUNITY
Start: 2018-07-12 | End: 1900-01-01

## 2020-03-04 ENCOUNTER — RX RENEWAL (OUTPATIENT)
Age: 65
End: 2020-03-04

## 2020-05-11 ENCOUNTER — RX RENEWAL (OUTPATIENT)
Age: 65
End: 2020-05-11

## 2020-08-01 DIAGNOSIS — Z01.818 ENCOUNTER FOR OTHER PREPROCEDURAL EXAMINATION: ICD-10-CM

## 2020-08-02 ENCOUNTER — APPOINTMENT (OUTPATIENT)
Dept: DISASTER EMERGENCY | Facility: CLINIC | Age: 65
End: 2020-08-02

## 2020-08-02 LAB — SARS-COV-2 N GENE NPH QL NAA+PROBE: NOT DETECTED

## 2020-08-17 ENCOUNTER — APPOINTMENT (OUTPATIENT)
Dept: ENDOCRINOLOGY | Facility: CLINIC | Age: 65
End: 2020-08-17
Payer: COMMERCIAL

## 2020-08-17 VITALS
TEMPERATURE: 97.8 F | WEIGHT: 153 LBS | HEIGHT: 65 IN | BODY MASS INDEX: 25.49 KG/M2 | OXYGEN SATURATION: 98 % | HEART RATE: 73 BPM | SYSTOLIC BLOOD PRESSURE: 160 MMHG | DIASTOLIC BLOOD PRESSURE: 90 MMHG

## 2020-08-17 PROCEDURE — 99214 OFFICE O/P EST MOD 30 MIN: CPT

## 2020-08-17 NOTE — HISTORY OF PRESENT ILLNESS
[FreeTextEntry1] : cc: diabetes\par \par 65 year old man with T2DM, uncontrolled.  He takes metformin and basal bolus insulin with lantus 16 units and novolog 8 units before meals.  Using rubin CGM, but left reader at home today.   He reports that values have been 90 - 140 mg/dl in the morning and up to 220 mg/dl later in the day.   Occasional overnight hypoglycemia.  He drinks 4 oz of juice and checks glucose again in 15 minutes.  He tries to limit carbohydrate intake,  Minimal exercise but intends to start.  Last optho visit was earlier this month.  Had cataract surgery last week. \par \par HTN: blood pressure has been variable. Takes ramipril (unsure of dose)\par hyperlipidemia; Not taking statin, \par \par vit d def'y - taking supplements

## 2020-08-17 NOTE — PHYSICAL EXAM
[Alert] : alert [Healthy Appearance] : healthy appearance [No Acute Distress] : no acute distress [Normal Sclera/Conjunctiva] : normal sclera/conjunctiva [No Proptosis] : no proptosis [No LAD] : no lymphadenopathy [Thyroid Not Enlarged] : the thyroid was not enlarged [No Respiratory Distress] : no respiratory distress [Clear to Auscultation] : lungs were clear to auscultation bilaterally [Normal S1, S2] : normal S1 and S2 [Regular Rhythm] : with a regular rhythm [No Edema] : no peripheral edema [Pedal Pulses Normal] : the pedal pulses are present [Normal Bowel Sounds] : normal bowel sounds [Not Tender] : non-tender [Soft] : abdomen soft [No Spinal Tenderness] : no spinal tenderness [No Involuntary Movements] : no involuntary movements were seen [Right Foot Was Examined] : right foot ~C was examined [Normal Reflexes] : deep tendon reflexes were 2+ and symmetric [Left Foot Was Examined] : left foot ~C was examined [Normal Sensation on Monofilament Testing] : normal sensation on monofilament testing of lower extremities [Normal Affect] : the affect was normal [No Tremors] : no tremors [Normal Mood] : the mood was normal [Foot Ulcers] : no foot ulcers

## 2020-08-17 NOTE — DATA REVIEWED
[FreeTextEntry1] : 7/2020\par hba1c 10.3\par k+ 4.0\par \par Cr 1.03\par egfr 88\par TSH 1.034/2019\par cr 1.05\par eGFR 87\par \par LDL 94\par \par Hba1c 9.6\par \par 1/2019\par cr 0.9\par \par

## 2020-08-17 NOTE — ASSESSMENT
[FreeTextEntry1] : 65 year old man with T2DM, uncontrolled, with frequent hypoglycemia\par  - Decrease lantus to 14 units,  Continue with novolog  8 units before meals. \par   - continue metformin \par  - bring cgm reader, will download, based on results adjust insulin\par   -  retinopathy screening up to date\par  - encouraged pt to increase exercise\par \par \par HTN: blood pressure elevated today, pt unsure of which medication he is taking now and current dose, will call back with regimen details\par  - check urine microalbumin, \par \par Hyperlipidemia: resume statin, repeat lipids next visit\par \par vit d def'y - continue vitamin D supplementation, check vitamin D next visit\par \par Pt with numbness in right hand, first 3 digits, recommend neurology evaluation\par \par f/u  in 3 months, send log or download in two weeks , call with any hypoglycemia

## 2020-08-18 LAB
CREAT SPEC-SCNC: 149 MG/DL
MICROALBUMIN 24H UR DL<=1MG/L-MCNC: 2.5 MG/DL
MICROALBUMIN/CREAT 24H UR-RTO: 17 MG/G

## 2020-11-11 ENCOUNTER — APPOINTMENT (OUTPATIENT)
Dept: NEUROLOGY | Facility: CLINIC | Age: 65
End: 2020-11-11
Payer: COMMERCIAL

## 2020-11-11 VITALS
BODY MASS INDEX: 25.49 KG/M2 | SYSTOLIC BLOOD PRESSURE: 140 MMHG | HEIGHT: 65 IN | WEIGHT: 153 LBS | HEART RATE: 81 BPM | DIASTOLIC BLOOD PRESSURE: 82 MMHG

## 2020-11-11 DIAGNOSIS — G44.209 TENSION-TYPE HEADACHE, UNSPECIFIED, NOT INTRACTABLE: ICD-10-CM

## 2020-11-11 DIAGNOSIS — Z83.3 FAMILY HISTORY OF DIABETES MELLITUS: ICD-10-CM

## 2020-11-11 DIAGNOSIS — M12.532: ICD-10-CM

## 2020-11-11 DIAGNOSIS — G62.9 POLYNEUROPATHY, UNSPECIFIED: ICD-10-CM

## 2020-11-11 DIAGNOSIS — S69.91XS UNSPECIFIED INJURY OF RIGHT WRIST, HAND AND FINGER(S), SEQUELA: ICD-10-CM

## 2020-11-11 PROCEDURE — 99203 OFFICE O/P NEW LOW 30 MIN: CPT

## 2020-11-11 PROCEDURE — 99072 ADDL SUPL MATRL&STAF TM PHE: CPT

## 2020-11-11 NOTE — REVIEW OF SYSTEMS
[Hand Weakness] :  hand weakness [Numbness] : numbness [Tingling] : tingling [As noted in HPI] : as noted in HPI [Arthralgias] : arthralgias [Joint Pain] : joint pain [Joint Stiffness] : joint stiffness [As Noted in HPI] : as noted in HPI [Negative] : Heme/Lymph

## 2020-11-11 NOTE — HISTORY OF PRESENT ILLNESS
[FreeTextEntry1] : Mr. Thompson is a 65-year-old -American gentleman seeking neurological  consultation and was referred by Dr. Jaime.\par \par The patient is a diabetic for the last 20 years and his hemoglobin A1c has always been high of them 7 and current hemoglobin A1c is reportedly 10.  He has history of hypertension, high cholesterol and had coronary artery stents.\par \par Current history complaints of right hand numbness in the thumb and middle finger since approximately 3 months which was insidious in onset and slightly progressing without associated significant weakness and no history of cervical radiculopathy.  There are no sensorimotor symptoms in the left hand but he had a wrist fracture requiring surgery and has a stiff fingers arthritic changes and  strength weakness.  There is also history of burning paresthesias in the feet and numbness ongoing for 6 months or more which was also insidious in onset.  He has posterior occipital upper cervical neck pain for over 1 year which only occurs over the weekend with mild stiff feeling in the neck and had MRI of the cervical spine by Dr. Becerra and was reportedly negative including history of low back pain for last 3 years which is local nonradicular in nature and an MRI of the lumbar spine by Dr. Villa and was told that he has a herniated disc but no treatment was prescribed according to the patient.  He promised to bring the MRI report for our review as it is not available in the medical records.\par \par Pertinent past medical history is for diabetes over last 20 years hypertension high cholesterol cardiac disease requiring stents history of cataract surgery but did not describe any diabetic complications of the iron kidney.  Other than left wrist surgery he had no other documented surgical history to his best recollection.  I reviewed his medications allergies and extensive medical history and several follow-up records with Dr. Jaime who is trying to control his diabetes high cholesterol and hypertension.  The patient is largely noncompliant and he does not take aspirin even though he was prescribed.  He does not comply with his diet and his diabetes is therefore under poor control.\par \par Review of systems is otherwise unremarkable.

## 2020-11-11 NOTE — DISCUSSION/SUMMARY
[FreeTextEntry1] : Opinion–for stroke heart attack has 20-year history of diabetes under poor control partly due to noncompliance and has features of right carpal tunnel syndrome, peripheral sensory neuropathy in the feet including involvement of small fibers secondary to diabetes because of burning paresthesias worse at night.  He was advised electrophysiologic testing of the right hand and both lower extremities and following the aforementioned I will treat and investigate him further if necessary but compliance was requested with good control of diabetes hypertension and high cholesterol.\par \par I had a detailed conversation including extensive education and counseling in the presence of his wife and told him that he is a high risk factor person because of uncontrolled diabetes hypertension and high cholesterol including coronary artery disease and that compliance is absolutely mandatory and correction of his metabolic abnormalities and hypertension.  He must continue good control of diabetes with his endocrinologist return back to his cardiologist for periodic cardiac evaluation proper nutrition dieting vitamin intake and exercises as allowed by his cardiologist because of coronary artery disease.  I explained everything in details and after I performed electrophysiologic testing after proper authorization I will discuss further treatment and investigations if necessary.  If the neck pain persists I will suggest local trigger point injections.  He will bring his MRI of the cervical and lumbar spine during his next visit. alert and awake

## 2020-11-11 NOTE — PHYSICAL EXAM
[FreeTextEntry1] : General examination is unremarkable.  There is no carotid bruit, thyromegaly or lymphadenopathy.  Chest is clear and heart sounds are normal.  Abdomen is soft and nontender.  Neck is supple with full range of motion and there is mild muscle spasm in the paracervical muscles.  His scalp is without tenderness and temporal artery pulsations are normal and there are no meningeal signs.  Lumbar spine range of motion is normal with history of chronic low back pain but without any radicular symptoms and straight leg raising test is negative.  Pedal perceptions are perceptible.  There is no femoral artery abnormality and there is no history of claudication.  There is no significant pedal edema.\par \par Neurological evaluation–positive findings include mild left handgrip weakness attributed to wrist fracture and surgery without any sensorimotor dysfunction and he is deep tendon reflexes are trace to absent in both lower extremities and 1+ in the upper extremities whereas there is mild decreased perception distally in the feet but posterior column sensations are normal including normal strength and gait is unimpaired and there is no muscle tenderness.  The rest of his neurological examination is as delineated. [General Appearance - Alert] : alert [General Appearance - In No Acute Distress] : in no acute distress [Oriented To Time, Place, And Person] : oriented to person, place, and time [Impaired Insight] : insight and judgment were intact [Affect] : the affect was normal [Person] : oriented to person [Place] : oriented to place [Time] : oriented to time [Concentration Intact] : normal concentrating ability [Visual Intact] : visual attention was ~T not ~L decreased [Naming Objects] : no difficulty naming common objects [Repeating Phrases] : no difficulty repeating a phrase [Writing A Sentence] : no difficulty writing a sentence [Fluency] : fluency intact [Comprehension] : comprehension intact [Reading] : reading intact [Past History] : adequate knowledge of personal past history [Cranial Nerves Optic (II)] : visual acuity intact bilaterally,  visual fields full to confrontation, pupils equal round and reactive to light [Cranial Nerves Oculomotor (III)] : extraocular motion intact [Cranial Nerves Trigeminal (V)] : facial sensation intact symmetrically [Cranial Nerves Facial (VII)] : face symmetrical [Cranial Nerves Vestibulocochlear (VIII)] : hearing was intact bilaterally [Cranial Nerves Glossopharyngeal (IX)] : tongue and palate midline [Cranial Nerves Accessory (XI - Cranial And Spinal)] : head turning and shoulder shrug symmetric [Cranial Nerves Hypoglossal (XII)] : there was no tongue deviation with protrusion [Motor Tone] : muscle tone was normal in all four extremities [No Muscle Atrophy] : normal bulk in all four extremities [Sensation Tactile Decrease] : light touch was intact [Abnormal Walk] : normal gait [Balance] : balance was intact [Past-pointing] : there was no past-pointing [Tremor] : no tremor present [1+] : Brachioradialis left 1+ [0] : Ankle jerk left 0 [Plantar Reflex Right Only] : normal on the right [Plantar Reflex Left Only] : normal on the left [FreeTextEntry6] : Mild  weakness in the left hand due to prior surgery in the left wrist. [FreeTextEntry7] : Mild decrease of pin and cold perception in the feet only.

## 2020-11-17 ENCOUNTER — APPOINTMENT (OUTPATIENT)
Dept: ENDOCRINOLOGY | Facility: CLINIC | Age: 65
End: 2020-11-17
Payer: COMMERCIAL

## 2020-11-17 VITALS
WEIGHT: 161 LBS | SYSTOLIC BLOOD PRESSURE: 166 MMHG | BODY MASS INDEX: 26.82 KG/M2 | TEMPERATURE: 98.3 F | HEART RATE: 85 BPM | OXYGEN SATURATION: 98 % | DIASTOLIC BLOOD PRESSURE: 84 MMHG | HEIGHT: 65 IN

## 2020-11-17 DIAGNOSIS — Z23 ENCOUNTER FOR IMMUNIZATION: ICD-10-CM

## 2020-11-17 PROCEDURE — 95251 CONT GLUC MNTR ANALYSIS I&R: CPT

## 2020-11-17 PROCEDURE — 99214 OFFICE O/P EST MOD 30 MIN: CPT | Mod: 25

## 2020-11-17 PROCEDURE — G0008: CPT

## 2020-11-17 PROCEDURE — 83036 HEMOGLOBIN GLYCOSYLATED A1C: CPT | Mod: QW

## 2020-11-17 PROCEDURE — 90662 IIV NO PRSV INCREASED AG IM: CPT

## 2020-11-17 PROCEDURE — 82962 GLUCOSE BLOOD TEST: CPT

## 2020-11-17 NOTE — PHYSICAL EXAM
[Alert] : alert [Healthy Appearance] : healthy appearance [No Acute Distress] : no acute distress [Normal Sclera/Conjunctiva] : normal sclera/conjunctiva [No Proptosis] : no proptosis [No LAD] : no lymphadenopathy [Thyroid Not Enlarged] : the thyroid was not enlarged [No Respiratory Distress] : no respiratory distress [Clear to Auscultation] : lungs were clear to auscultation bilaterally [Normal S1, S2] : normal S1 and S2 [Regular Rhythm] : with a regular rhythm [No Edema] : no peripheral edema [Normal Bowel Sounds] : normal bowel sounds [Not Tender] : non-tender [Soft] : abdomen soft [No Spinal Tenderness] : no spinal tenderness [No Involuntary Movements] : no involuntary movements were seen [Normal Strength/Tone] : muscle strength and tone were normal [Normal Reflexes] : deep tendon reflexes were 2+ and symmetric [No Tremors] : no tremors [Normal Affect] : the affect was normal [Normal Mood] : the mood was normal

## 2020-11-18 LAB
GLUCOSE BLDC GLUCOMTR-MCNC: 149
HBA1C MFR BLD HPLC: 10.6

## 2020-11-18 NOTE — DATA REVIEWED
[FreeTextEntry1] : 7/2020\par hba1c 10.3\par k+ 4.0\par \par Cr 1.03\par egfr 88\par TSH 1.03\par \par 4/2019\par cr 1.05\par eGFR 87\par \par LDL 94\par \par Hba1c 9.6\par \par 1/2019\par cr 0.9\par \par

## 2020-11-18 NOTE — HISTORY OF PRESENT ILLNESS
[FreeTextEntry1] : cc: diabetes\par \par 65 year old man with T2DM, uncontrolled. He has made significant dietary changes last week and values have improved.  He takes metformin and basal bolus insulin with lantus 14 units and novolog 8 units before meals.  Using rubin CGM, values have been under 150 mg/dl since changing diet.  Has had some hypoglycemia, generally overnight.   Occasional overnight hypoglycemia. Trying to increase exercise, walking and 5 flights of stairs in the morning..  Last optho visit was a few months ago, has appt next week.  Had cataract surgery.\par Has not had  flu shot - is amenable this year due to covid\par \par HTN: blood pressure has been controlled, no change in regimen, on ACE.  Did not take medication this morning (forgot)\par \par hyperlipidemia; Has not started statin, makes him nauseous \par \par vit d def'y - taking supplements\par \par has been diagnosed with carpal tunnel syndrome

## 2020-11-18 NOTE — ASSESSMENT
[FreeTextEntry1] : 65 year old man with T2DM, uncontrolled, with frequent hypoglycemia \par  - CGm downloaded and  reviewed.  Pt with TIR 55% and frequent hypoglycemia, with significant missing data\par  - Decrease lantus to 12 units,  Continue with novolog  8 units before meals. \par  - increase frequency of scanning and send download in 2 weeks\par   - continue metformin \par  - bring cgm reader, will download, based on results adjust insulin\par   -  retinopathy screening up to date\par  - encouraged pt to increase exercise\par  - flu shot administered today\par  - check bmp\par \par HTN: blood pressure elevated today, pt did not take his antihypertensive agents today, reports good control while taking them\par  - repeat bp while on medication, reviewed barriers to adherence. \par \par Hyperlipidemia:try low dose atorvastatin, repeat lipids next visit\par \par vit d def'y - continue vitamin D supplementation, check vitamin D \par \par \par \par f/u  in 3 months, send log or download in two weeks , call with any hypoglycemia

## 2020-11-19 LAB
ANION GAP SERPL CALC-SCNC: 9 MMOL/L
BUN SERPL-MCNC: 9 MG/DL
CALCIUM SERPL-MCNC: 9.3 MG/DL
CHLORIDE SERPL-SCNC: 103 MMOL/L
CO2 SERPL-SCNC: 27 MMOL/L
CREAT SERPL-MCNC: 0.83 MG/DL
GLUCOSE SERPL-MCNC: 111 MG/DL
POTASSIUM SERPL-SCNC: 3.9 MMOL/L
SODIUM SERPL-SCNC: 139 MMOL/L

## 2020-11-20 ENCOUNTER — NON-APPOINTMENT (OUTPATIENT)
Age: 65
End: 2020-11-20

## 2021-01-25 ENCOUNTER — APPOINTMENT (OUTPATIENT)
Dept: NEUROLOGY | Facility: CLINIC | Age: 66
End: 2021-01-25
Payer: COMMERCIAL

## 2021-01-25 PROCEDURE — 95885 MUSC TST DONE W/NERV TST LIM: CPT | Mod: 59

## 2021-01-25 PROCEDURE — 95912 NRV CNDJ TEST 11-12 STUDIES: CPT

## 2021-01-25 PROCEDURE — 95886 MUSC TEST DONE W/N TEST COMP: CPT

## 2021-01-25 PROCEDURE — 99072 ADDL SUPL MATRL&STAF TM PHE: CPT

## 2021-03-23 ENCOUNTER — APPOINTMENT (OUTPATIENT)
Dept: ENDOCRINOLOGY | Facility: CLINIC | Age: 66
End: 2021-03-23
Payer: COMMERCIAL

## 2021-03-23 VITALS
OXYGEN SATURATION: 98 % | DIASTOLIC BLOOD PRESSURE: 84 MMHG | HEIGHT: 65 IN | HEART RATE: 86 BPM | WEIGHT: 163 LBS | SYSTOLIC BLOOD PRESSURE: 130 MMHG | TEMPERATURE: 98.2 F | BODY MASS INDEX: 27.16 KG/M2

## 2021-03-23 LAB
GLUCOSE BLDC GLUCOMTR-MCNC: 255
HBA1C MFR BLD HPLC: 10.5

## 2021-03-23 PROCEDURE — 99214 OFFICE O/P EST MOD 30 MIN: CPT | Mod: 25

## 2021-03-23 PROCEDURE — 82962 GLUCOSE BLOOD TEST: CPT

## 2021-03-23 PROCEDURE — 83036 HEMOGLOBIN GLYCOSYLATED A1C: CPT | Mod: QW

## 2021-03-23 PROCEDURE — 99072 ADDL SUPL MATRL&STAF TM PHE: CPT

## 2021-03-24 NOTE — HISTORY OF PRESENT ILLNESS
[FreeTextEntry1] : cc: diabetes\par \par 65 year old man with T2DM, uncontrolled. He has started to increase exercise with the warmer weather.  Trying to limit carbohydrate intake.  He takes metformin and basal bolus insulin with lantus 16 units and novolog 8 units before meals.  Using rubin CGM, values have been variable with both hyperglycemia and hypoglycemia.   Last optho visit was a week ago, Has retinopathy, undergoing laser tx. \par Had  flu shot - has not yet had a chance to get the covid vaccine, plans on doing so.\par \par HTN: blood pressure has been controlled, , on ACE.  \par \par hyperlipidemia; Has started statin, no side effects\par \par vit d def'y - taking supplemental vitamin D\par \par

## 2021-03-24 NOTE — ASSESSMENT
[FreeTextEntry1] : 65 year old man with T2DM, uncontrolled, with hypoglycemia and postprandial hyperglycemia\par  - CGm downloaded and  reviewed.  Pt with TIR 27% and 9% hypoglycemia, \par  - Decrease lantus to 12 units,   Reviewed kinetics of novolog and that it should be taken before meals; increase dose to 9 units. \par  - increase frequency of scanning and send download in 2 weeks\par   - continue metformin \par   -  retinopathy screening up to date\par  - encouraged pt to increase exercise\par  - had flu shot, will schedule covid vaccine\par  - check cmp\par \par HTN: blood pressure at goal, on ACE.\par \par Hyperlipidemia :on atorvastatin, repeat lipids \par \par vit d def'y - continue vitamin D supplementation, check vitamin D \par \par \par \par f/u  in 3 months, send log or download in two weeks , call with any hypoglycemia

## 2021-03-25 LAB
25(OH)D3 SERPL-MCNC: 19.8 NG/ML
ALBUMIN SERPL ELPH-MCNC: 4.2 G/DL
ALP BLD-CCNC: 108 U/L
ALT SERPL-CCNC: 21 U/L
ANION GAP SERPL CALC-SCNC: 10 MMOL/L
AST SERPL-CCNC: 18 U/L
BILIRUB SERPL-MCNC: 0.7 MG/DL
BUN SERPL-MCNC: 18 MG/DL
CALCIUM SERPL-MCNC: 9.2 MG/DL
CHLORIDE SERPL-SCNC: 101 MMOL/L
CHOLEST SERPL-MCNC: 209 MG/DL
CO2 SERPL-SCNC: 27 MMOL/L
CREAT SERPL-MCNC: 1.02 MG/DL
GLUCOSE SERPL-MCNC: 245 MG/DL
HDLC SERPL-MCNC: 63 MG/DL
LDLC SERPL CALC-MCNC: 137 MG/DL
NONHDLC SERPL-MCNC: 146 MG/DL
POTASSIUM SERPL-SCNC: 4.2 MMOL/L
PROT SERPL-MCNC: 6.4 G/DL
SODIUM SERPL-SCNC: 138 MMOL/L
TRIGL SERPL-MCNC: 45 MG/DL

## 2021-04-26 ENCOUNTER — APPOINTMENT (OUTPATIENT)
Dept: NEUROLOGY | Facility: CLINIC | Age: 66
End: 2021-04-26
Payer: COMMERCIAL

## 2021-05-05 ENCOUNTER — APPOINTMENT (OUTPATIENT)
Dept: NEUROLOGY | Facility: CLINIC | Age: 66
End: 2021-05-05
Payer: COMMERCIAL

## 2021-05-05 VITALS — DIASTOLIC BLOOD PRESSURE: 99 MMHG | HEIGHT: 65 IN | HEART RATE: 68 BPM | SYSTOLIC BLOOD PRESSURE: 180 MMHG

## 2021-05-05 VITALS — TEMPERATURE: 96.7 F

## 2021-05-05 DIAGNOSIS — E11.42 TYPE 2 DIABETES MELLITUS WITH DIABETIC POLYNEUROPATHY: ICD-10-CM

## 2021-05-05 DIAGNOSIS — G56.01 CARPAL TUNNEL SYNDROME, RIGHT UPPER LIMB: ICD-10-CM

## 2021-05-05 PROCEDURE — 99072 ADDL SUPL MATRL&STAF TM PHE: CPT

## 2021-05-05 PROCEDURE — 99213 OFFICE O/P EST LOW 20 MIN: CPT

## 2021-05-06 PROBLEM — E11.42 DIABETIC POLYNEUROPATHY ASSOCIATED WITH TYPE 2 DIABETES MELLITUS: Status: ACTIVE | Noted: 2020-11-11

## 2021-05-06 PROBLEM — G56.01 CARPAL TUNNEL SYNDROME OF RIGHT WRIST: Status: ACTIVE | Noted: 2020-11-11

## 2021-05-06 NOTE — HISTORY OF PRESENT ILLNESS
[FreeTextEntry1] : Mr. Thompson returns for follow-up evaluation and was advised that he has mild carpal tunnel syndrome and diabetic polyneuropathy and according to his medical records he is very noncompliant and was cautioned that proper medical care can only be provided if he returns back for follow-up and complies with our request specifically in view of uncontrolled diabetes.  His hemoglobin A1c have been consistently high and was advised that his diabetic polyneuropathy will continue but at least with good control of diabetes it may be stable and very rarely it may even improve and he understands.  He was advised using the splint for his carpal tunnel syndrome but he never complied and was cautioned that unless he uses it there be no therapeutic advantage.  His vital signs were recorded and stable with mild systolic hypertension and has multiple stroke and cardiac risk factors including diabetes high cholesterol and prior history of mild stroke.  He stated that his carpal tunnel syndrome symptoms have completely subsided uneventfully.  He however continues to have distal numbness in both feet.\par \par There are no additional symptoms in the review of systems and no interim past medical history.

## 2021-05-06 NOTE — PHYSICAL EXAM
[FreeTextEntry1] : General examination is unremarkable.  He has mild systolic hypertension and was advised to discuss this matter with his internist including management of his diabetes and high cholesterol.  There is no carotid bruit, thyromegaly or lymphadenopathy.  Chest is clear and heart sounds are normal.  Pedal pulsations are present.\par \par Neurological examination reveals normal mental functions.  There is no diabetic retinopathy and visual fields are full pupils are brisk and there is no facial weakness.  Bulbar functions are intact.  Motor tone is preserved with symmetric normal strength and absent ankle reflex bilaterally with decreased perception of pin cold and vibration below the ankles with normal position sense and gait is unimpaired with normal coordination.

## 2021-06-14 ENCOUNTER — RX RENEWAL (OUTPATIENT)
Age: 66
End: 2021-06-14

## 2021-06-14 RX ORDER — GABAPENTIN 100 MG/1
100 CAPSULE ORAL 3 TIMES DAILY
Qty: 90 | Refills: 0 | Status: ACTIVE | COMMUNITY
Start: 2021-01-25 | End: 1900-01-01

## 2021-06-22 ENCOUNTER — APPOINTMENT (OUTPATIENT)
Dept: ENDOCRINOLOGY | Facility: CLINIC | Age: 66
End: 2021-06-22

## 2021-10-12 ENCOUNTER — APPOINTMENT (OUTPATIENT)
Dept: ENDOCRINOLOGY | Facility: CLINIC | Age: 66
End: 2021-10-12
Payer: COMMERCIAL

## 2021-10-12 VITALS
OXYGEN SATURATION: 98 % | DIASTOLIC BLOOD PRESSURE: 88 MMHG | TEMPERATURE: 97.2 F | BODY MASS INDEX: 24.96 KG/M2 | SYSTOLIC BLOOD PRESSURE: 148 MMHG | WEIGHT: 150 LBS | HEART RATE: 69 BPM

## 2021-10-12 PROCEDURE — 83036 HEMOGLOBIN GLYCOSYLATED A1C: CPT | Mod: QW

## 2021-10-12 PROCEDURE — 99214 OFFICE O/P EST MOD 30 MIN: CPT | Mod: 25

## 2021-10-13 LAB — HBA1C MFR BLD HPLC: 11.9

## 2021-10-13 NOTE — PHYSICAL EXAM
[Alert] : alert [Healthy Appearance] : healthy appearance [No Acute Distress] : no acute distress [Normal Sclera/Conjunctiva] : normal sclera/conjunctiva [No Proptosis] : no proptosis [No LAD] : no lymphadenopathy [Thyroid Not Enlarged] : the thyroid was not enlarged [No Respiratory Distress] : no respiratory distress [Clear to Auscultation] : lungs were clear to auscultation bilaterally [Normal S1, S2] : normal S1 and S2 [No Edema] : no peripheral edema [Regular Rhythm] : with a regular rhythm [Pedal Pulses Normal] : the pedal pulses are present [Normal Bowel Sounds] : normal bowel sounds [Not Tender] : non-tender [Soft] : abdomen soft [No Spinal Tenderness] : no spinal tenderness [No Involuntary Movements] : no involuntary movements were seen [Normal Strength/Tone] : muscle strength and tone were normal [Right foot was examined, including] : right foot ~C was examined, including visual inspection with sensory and pulse exams [Left foot was examined, including] : left foot ~C was examined, including visual inspection with sensory and pulse exams [Normal Reflexes] : deep tendon reflexes were 2+ and symmetric [No Tremors] : no tremors [Normal Sensation on Monofilament Testing] : normal sensation on monofilament testing of lower extremities [Normal Affect] : the affect was normal [Normal Mood] : the mood was normal [Foot Ulcers] : no foot ulcers

## 2021-10-13 NOTE — ASSESSMENT
[FreeTextEntry1] : 65 year old man with T2DM, uncontrolled, \par  - Pt without CGM reader today, so unable to download\par  - Send CGM download or bring reader. Based on results will adjust insulin doses.   Meanwhile, continue current regimen\par   - Consider adding SGLT2 for CV benefit.  Check c-peptide \par   -  retinopathy care up to date\par  - encouraged pt to increase exercise\par  - had  covid vaccine\par  - wants to delay flu shot for now\par  - check cmp\par \par HTN: blood pressure mildly elevated, will f/u with PMD and cardiology.  On ARB.\par  - check urine microalbumin\par \par Hyperlipidemia :now on atorvastatin, repeat lipids \par \par vit d def'y - resume daily vitamin D supplementation, \par \par \par \par f/u  in 3 months, send CGM download this week

## 2021-10-13 NOTE — HISTORY OF PRESENT ILLNESS
[FreeTextEntry1] : cc: diabetes\par \par 65 year old man with T2DM, uncontrolled.  Using rubin CGM, values have been variable. Does not have CGM reader today  NOt always monitoring diet/limiting carbohydrate intake.  Has been walking 2-3 days per week.  He stopped metformin due to GI side effects, takes basal bolus insulin with lantus 16 units and novolog 9 units before meals.   Last optho visit was a October 2021 ago, Has retinopathy, had laser tx. \par Does not want  flu shot today, will consider later in the season\par Had covid vaccine, pfizer  \par \par HTN: blood pressure has been controlled, , on RB.  (was prescribed additional medication by cardiologist, but not taking them - takes entresto, valsartan, hctz, amlodipine)\par \par hyperlipidemia; atorvastatin, no side effects\par \par vit d def'y - taking supplemental vitamin D intermittently\par \par

## 2021-10-21 LAB
ANION GAP SERPL CALC-SCNC: 13 MMOL/L
BUN SERPL-MCNC: 16 MG/DL
C PEPTIDE SERPL-MCNC: 0.7 NG/ML
CALCIUM SERPL-MCNC: 9.7 MG/DL
CHLORIDE SERPL-SCNC: 97 MMOL/L
CHOLEST SERPL-MCNC: 283 MG/DL
CO2 SERPL-SCNC: 27 MMOL/L
CREAT SERPL-MCNC: 1.01 MG/DL
CREAT SPEC-SCNC: 171 MG/DL
GLUCOSE SERPL-MCNC: 170 MG/DL
HDLC SERPL-MCNC: 78 MG/DL
LDLC SERPL CALC-MCNC: 191 MG/DL
MICROALBUMIN 24H UR DL<=1MG/L-MCNC: 18.1 MG/DL
MICROALBUMIN/CREAT 24H UR-RTO: 106 MG/G
NONHDLC SERPL-MCNC: 205 MG/DL
POTASSIUM SERPL-SCNC: 3.6 MMOL/L
SODIUM SERPL-SCNC: 137 MMOL/L
TRIGL SERPL-MCNC: 69 MG/DL

## 2022-02-08 ENCOUNTER — APPOINTMENT (OUTPATIENT)
Dept: ENDOCRINOLOGY | Facility: CLINIC | Age: 67
End: 2022-02-08
Payer: COMMERCIAL

## 2022-02-08 VITALS
WEIGHT: 151 LBS | BODY MASS INDEX: 25.16 KG/M2 | HEART RATE: 92 BPM | HEIGHT: 65 IN | TEMPERATURE: 98.1 F | OXYGEN SATURATION: 98 % | SYSTOLIC BLOOD PRESSURE: 130 MMHG | DIASTOLIC BLOOD PRESSURE: 80 MMHG

## 2022-02-08 LAB
GLUCOSE BLDC GLUCOMTR-MCNC: 293
HBA1C MFR BLD HPLC: 11.2

## 2022-02-08 PROCEDURE — 95251 CONT GLUC MNTR ANALYSIS I&R: CPT

## 2022-02-08 PROCEDURE — 82962 GLUCOSE BLOOD TEST: CPT

## 2022-02-08 PROCEDURE — 99214 OFFICE O/P EST MOD 30 MIN: CPT | Mod: 25

## 2022-02-08 PROCEDURE — G0008: CPT

## 2022-02-08 PROCEDURE — 83036 HEMOGLOBIN GLYCOSYLATED A1C: CPT | Mod: QW

## 2022-02-08 PROCEDURE — 90662 IIV NO PRSV INCREASED AG IM: CPT

## 2022-02-08 RX ORDER — METOPROLOL SUCCINATE 25 MG/1
25 TABLET, EXTENDED RELEASE ORAL
Qty: 90 | Refills: 0 | Status: ACTIVE | COMMUNITY
Start: 2021-03-19

## 2022-02-08 RX ORDER — HYDROCHLOROTHIAZIDE 25 MG/1
25 TABLET ORAL
Qty: 90 | Refills: 0 | Status: ACTIVE | COMMUNITY
Start: 2021-10-04

## 2022-02-08 RX ORDER — AMLODIPINE BESYLATE 10 MG/1
10 TABLET ORAL
Qty: 90 | Refills: 0 | Status: ACTIVE | COMMUNITY
Start: 2021-10-04

## 2022-02-08 RX ORDER — RAMIPRIL 2.5 MG/1
2.5 CAPSULE ORAL
Refills: 0 | Status: DISCONTINUED | COMMUNITY
End: 2022-02-08

## 2022-02-08 RX ORDER — SACUBITRIL AND VALSARTAN 49; 51 MG/1; MG/1
49-51 TABLET, FILM COATED ORAL
Qty: 180 | Refills: 0 | Status: ACTIVE | COMMUNITY
Start: 2021-03-19

## 2022-02-08 RX ORDER — VALSARTAN 320 MG/1
320 TABLET, COATED ORAL
Qty: 90 | Refills: 0 | Status: ACTIVE | COMMUNITY
Start: 2021-10-04

## 2022-02-08 NOTE — HISTORY OF PRESENT ILLNESS
[FreeTextEntry1] : cc: diabetes\par \par 66 year old man with T2DM, uncontrolled.  Using rubin CGM, values remain variable.    He stopped metformin due to GI side effects, takes basal bolus insulin with lantus 16 units and novolog 8 units before meals. \par Trying to limit carbohydrate intake, minimizing rice, white flour, no juice\par Reports minimal exercise\par  Last optho visit was January 2022, Has retinopathy, had laser tx. \par Has not yet had flu shot\par Had covid vaccine, pfizer  hs not yet had booster\par \par HTN: blood pressure has been controlled, , on ARB. takes entresto, valsartan, hctz, amlodipine\par \par hyperlipidemia; taking  atorvastatin, but only intermittently\par \par vit d def'y - not taking vitamin D \par \par

## 2022-02-08 NOTE — ASSESSMENT
[FreeTextEntry1] : 66 year old man with T2DM, uncontrolled, \par  - CGM downloaded and reviewed, pt with 16% TIR, 5% hypoglycemia.  No consistent patterns\par  - Frequently not adherent with insulin.  Will make sure to take every dose for 3 days and send download.  Based on results will adjust dose, meanwhile continue current regimen\par Discussed techniques to improve adherence, discussed limiting carbs at lunch if unable to take insulin while at work.\par - PT with low c-peptide.  Will prescribe weekly GLP1 for CV benefit \par   -  retinopathy care up to date\par  - encouraged pt to increase exercise\par  - had  covid vaccine, recommend booster\par  - flu shot administered today\par \par HTN: blood pressure mildly elevated, will f/u with PMD and cardiology.  On ARB.\par  - urine microalbumin +\par \par Hyperlipidemia: encouraged pt to increase  atorvastatin to daily dosing\par \par vit d def'y - resume vitamin D supplementation, \par \par \par \par f/u  in 3 months, send CGM download in once week, with list of missed insulin doses

## 2022-02-14 ENCOUNTER — RX RENEWAL (OUTPATIENT)
Age: 67
End: 2022-02-14

## 2022-03-14 ENCOUNTER — RX RENEWAL (OUTPATIENT)
Age: 67
End: 2022-03-14

## 2022-03-15 RX ORDER — INSULIN ASPART 100 [IU]/ML
100 INJECTION, SOLUTION INTRAVENOUS; SUBCUTANEOUS
Qty: 15 | Refills: 3 | Status: DISCONTINUED | COMMUNITY
Start: 2019-01-22 | End: 2022-03-15

## 2022-05-10 ENCOUNTER — APPOINTMENT (OUTPATIENT)
Dept: ENDOCRINOLOGY | Facility: CLINIC | Age: 67
End: 2022-05-10

## 2022-06-28 ENCOUNTER — APPOINTMENT (OUTPATIENT)
Dept: ENDOCRINOLOGY | Facility: CLINIC | Age: 67
End: 2022-06-28

## 2022-06-28 VITALS
OXYGEN SATURATION: 99 % | BODY MASS INDEX: 25.66 KG/M2 | WEIGHT: 154 LBS | HEIGHT: 65 IN | SYSTOLIC BLOOD PRESSURE: 120 MMHG | DIASTOLIC BLOOD PRESSURE: 80 MMHG | HEART RATE: 81 BPM | TEMPERATURE: 97.7 F

## 2022-06-28 PROCEDURE — 99214 OFFICE O/P EST MOD 30 MIN: CPT

## 2022-06-28 NOTE — PHYSICAL EXAM
[Alert] : alert [Healthy Appearance] : healthy appearance [No Acute Distress] : no acute distress [Normal Sclera/Conjunctiva] : normal sclera/conjunctiva [No Proptosis] : no proptosis [No LAD] : no lymphadenopathy [Thyroid Not Enlarged] : the thyroid was not enlarged [No Respiratory Distress] : no respiratory distress [Clear to Auscultation] : lungs were clear to auscultation bilaterally [Normal S1, S2] : normal S1 and S2 [Regular Rhythm] : with a regular rhythm [No Edema] : no peripheral edema [Normal Bowel Sounds] : normal bowel sounds [Not Tender] : non-tender [Soft] : abdomen soft [No Spinal Tenderness] : no spinal tenderness [No Involuntary Movements] : no involuntary movements were seen [Normal Strength/Tone] : muscle strength and tone were normal [No Motor Deficits] : the motor exam was normal [No Tremors] : no tremors [Normal Affect] : the affect was normal [Normal Mood] : the mood was normal

## 2022-06-29 NOTE — ASSESSMENT
[FreeTextEntry1] : 66 year old man with T2DM, uncontrolled, \par  -No CGm reader today \par  - Reviewed with pt kinetics and action of levemir and humalog; change to Levemir 8 bid and humalog 8 before meals\par  - download CGm in one week\par - Adherence with insulin has improved, reviewed barriers, encouraged to continue (though with changes above) \par - PT with low c-peptide so will avoid sglt2.  Will prescribe weekly GLP1 for CV benefit \par   -  retinopathy care up to date\par  - encouraged pt with plans to increase exercise\par  - had  covid vaccine\par  \par \par HTN: blood pressure at goal, will f/u with PMD and cardiology.  On ARB. - will send name of new medication\par  - urine microalbumin +\par \par Hyperlipidemia: encouraged pt to take   atorvastatin  daily \par \par vit d def'y - continue vitamin D supplementation, \par \par \par \par f/u  in 3 months, send CGM download in once week, with list of missed insulin doses

## 2022-06-29 NOTE — HISTORY OF PRESENT ILLNESS
[FreeTextEntry1] : cc: diabetes\par \par 66 year old man with T2DM, uncontrolled. Recent A1c elevated.   Using rubin CGM, forgot reader today.   He stopped metformin due to GI side effects, takes basal bolus insulin medications were changes and he has been taking with levemir 8 units before each meal and humalog 10 units three times a day (previously on lantus 16 day and novolog 8 before meals).  Having hypo and hyperglycemia.\par Diet - has cut out sugar, not as careful about carbs recently.  \par Intermittent exercise, but just bought exercise bike intends to start.   \par  Last optho visit was January 2022, Has retinopathy, had laser tx. \par Had Namshi vaccine, pfizer  \par \par HTN: blood pressure has been controlled (though was elevated at recent pmd visit and new medication added, he has not picked it up yet), , on ARB. takes entresto, valsartan, hctz, amlodipine\par \par hyperlipidemia; taking  atorvastatin, but only intermittently\par \par vit d def'y - taking vitamin D \par \par

## 2022-09-19 RX ORDER — INSULIN LISPRO 100 [IU]/ML
100 INJECTION, SOLUTION INTRAVENOUS; SUBCUTANEOUS
Qty: 2 | Refills: 3 | Status: ACTIVE | COMMUNITY
Start: 2022-03-15 | End: 1900-01-01

## 2022-11-01 ENCOUNTER — APPOINTMENT (OUTPATIENT)
Dept: ENDOCRINOLOGY | Facility: CLINIC | Age: 67
End: 2022-11-01

## 2022-11-01 VITALS
HEART RATE: 78 BPM | BODY MASS INDEX: 25.96 KG/M2 | TEMPERATURE: 97.2 F | WEIGHT: 156 LBS | SYSTOLIC BLOOD PRESSURE: 158 MMHG | OXYGEN SATURATION: 98 % | DIASTOLIC BLOOD PRESSURE: 92 MMHG

## 2022-11-01 PROCEDURE — 99214 OFFICE O/P EST MOD 30 MIN: CPT | Mod: 25

## 2022-11-01 PROCEDURE — 90662 IIV NO PRSV INCREASED AG IM: CPT

## 2022-11-01 PROCEDURE — 95251 CONT GLUC MNTR ANALYSIS I&R: CPT

## 2022-11-01 PROCEDURE — 82962 GLUCOSE BLOOD TEST: CPT

## 2022-11-01 PROCEDURE — G0008: CPT

## 2022-11-01 PROCEDURE — 83036 HEMOGLOBIN GLYCOSYLATED A1C: CPT | Mod: QW

## 2022-11-01 RX ORDER — METFORMIN ER 500 MG 500 MG/1
500 TABLET ORAL
Qty: 360 | Refills: 3 | Status: DISCONTINUED | COMMUNITY
Start: 2017-09-05 | End: 2022-11-01

## 2022-11-02 LAB
GLUCOSE BLDC GLUCOMTR-MCNC: 272
HBA1C MFR BLD HPLC: 11.4

## 2022-11-02 RX ORDER — INSULIN GLARGINE 100 [IU]/ML
100 INJECTION, SOLUTION SUBCUTANEOUS
Qty: 1 | Refills: 3 | Status: DISCONTINUED | COMMUNITY
Start: 2019-01-22 | End: 2022-11-02

## 2022-11-02 NOTE — HISTORY OF PRESENT ILLNESS
[FreeTextEntry1] : cc: diabetes\par \par 67 year old man with T2DM, uncontrolled.   Using rubin CGM, Values have been very variable.  He is only taking humalog because only got humalog refill, not levemir or lantus. Taking 10 units before each meal.  Has had GI side effects with metformin .  He never received the ozempic so did not start\par Diet - Trying to limit carbohydrate intake\par Exercise,- using exercise bike off and on\par  Last optho visit was September 2022, Has retinopathy, had laser tx. Has f/u scheduled\par Had covid vaccine, pfizer  \par Has not yet had flu shot\par \par HTN: blood pressure has been controlled. monitors it at home , on ARB. takes entresto, valsartan, hctz, amlodipine\par \par hyperlipidemia; taking  atorvastatin, but still only intermittently, forgets\par \par vit d def'y - taking vitamin D \par \par

## 2022-11-02 NOTE — PHYSICAL EXAM
[Alert] : alert [Healthy Appearance] : healthy appearance [No Acute Distress] : no acute distress [Normal Sclera/Conjunctiva] : normal sclera/conjunctiva [No Proptosis] : no proptosis [No LAD] : no lymphadenopathy [Thyroid Not Enlarged] : the thyroid was not enlarged [No Respiratory Distress] : no respiratory distress [Clear to Auscultation] : lungs were clear to auscultation bilaterally [Normal S1, S2] : normal S1 and S2 [Regular Rhythm] : with a regular rhythm [Normal Bowel Sounds] : normal bowel sounds [Not Tender] : non-tender [Soft] : abdomen soft [No Spinal Tenderness] : no spinal tenderness [No Involuntary Movements] : no involuntary movements were seen [Normal Strength/Tone] : muscle strength and tone were normal [Right foot was examined, including] : right foot ~C was examined, including visual inspection with sensory and pulse exams [Left foot was examined, including] : left foot ~C was examined, including visual inspection with sensory and pulse exams [Normal Reflexes] : deep tendon reflexes were 2+ and symmetric [No Tremors] : no tremors [Normal Sensation on Monofilament Testing] : normal sensation on monofilament testing of lower extremities [Normal Affect] : the affect was normal [Normal Mood] : the mood was normal [Foot Ulcers] : no foot ulcers [de-identified] : tr LE edemia; decreased DP pulses [de-identified] : + callus, dry skin

## 2022-12-01 ENCOUNTER — APPOINTMENT (OUTPATIENT)
Dept: ENDOCRINOLOGY | Facility: CLINIC | Age: 67
End: 2022-12-01

## 2022-12-01 VITALS
BODY MASS INDEX: 25.96 KG/M2 | OXYGEN SATURATION: 98 % | HEART RATE: 82 BPM | WEIGHT: 156 LBS | SYSTOLIC BLOOD PRESSURE: 134 MMHG | DIASTOLIC BLOOD PRESSURE: 88 MMHG

## 2022-12-01 PROCEDURE — 99214 OFFICE O/P EST MOD 30 MIN: CPT

## 2022-12-01 RX ORDER — BLOOD-GLUCOSE SENSOR
EACH MISCELLANEOUS
Qty: 6 | Refills: 2 | Status: ACTIVE | COMMUNITY
Start: 2022-12-01 | End: 1900-01-01

## 2022-12-01 RX ORDER — FLASH GLUCOSE SENSOR
KIT MISCELLANEOUS
Qty: 1 | Refills: 0 | Status: ACTIVE | COMMUNITY
Start: 2022-12-01 | End: 1900-01-01

## 2022-12-01 NOTE — PHYSICAL EXAM
[Alert] : alert [Healthy Appearance] : healthy appearance [No Acute Distress] : no acute distress [Normal Sclera/Conjunctiva] : normal sclera/conjunctiva [No Proptosis] : no proptosis [No LAD] : no lymphadenopathy [Thyroid Not Enlarged] : the thyroid was not enlarged [No Respiratory Distress] : no respiratory distress [Clear to Auscultation] : lungs were clear to auscultation bilaterally [Normal S1, S2] : normal S1 and S2 [Regular Rhythm] : with a regular rhythm [Normal Bowel Sounds] : normal bowel sounds [Not Tender] : non-tender [Soft] : abdomen soft [No Spinal Tenderness] : no spinal tenderness [No Involuntary Movements] : no involuntary movements were seen [Normal Strength/Tone] : muscle strength and tone were normal [Right foot was examined, including] : right foot ~C was examined, including visual inspection with sensory and pulse exams [Left foot was examined, including] : left foot ~C was examined, including visual inspection with sensory and pulse exams [Normal Reflexes] : deep tendon reflexes were 2+ and symmetric [No Tremors] : no tremors [Normal Sensation on Monofilament Testing] : normal sensation on monofilament testing of lower extremities [Normal Affect] : the affect was normal [Normal Mood] : the mood was normal [Foot Ulcers] : no foot ulcers [de-identified] : tr LE edemia; decreased DP pulses [de-identified] : + callus, dry skin

## 2022-12-01 NOTE — ASSESSMENT
[FreeTextEntry1] : 67 year old man with T2DM, uncontrolled, \par \par 1. Type 2 DM:\par Blood Glucose Monitoring: target 36%, high 30%, very high 28%\par running high in AM.  high after breakfast. One true low after dinner. \par \par Plan:\par -Decrease Semglee to 15 units\par -Continue Humalog 8 units pre-meal -- dose consistently, keep and bring a log of any missed doses. \par -Continue Ozempic 0.5 mg weekly-- just took his first dose today. Injects on Thursday. \par -RTC Yu one month\par -Try for Shanon 3 as patient is not always able to scna throughout the day due to his job, he would benefit from a continuous glucose monitoring without the need to scan every 8 hours. \par \par - PT with low c-peptide so will avoid sglt2.  \par -retinopathy care up to date\par -encouraged pt  to increase exercise\par \par 2. HTN:  On ARB.  - urine microalbumin +    -Blood pressure improved/ near goal. \par 3. Hyperlipidemia: encouraged pt to take atorvastatin daily. \par 4. Vitamin D deficiency: - continue vitamin D supplementation, \par  Detail Level: Generalized

## 2022-12-01 NOTE — HISTORY OF PRESENT ILLNESS
[FreeTextEntry1] : cc: diabetes\par \par 67 year old man with T2DM, uncontrolled.  \par \par Current Regimen:\par Semglee 16 units daily -- takes 8am \par Humalog 8 units pre-meal-- endorses missing doses before breakfast. \par Ozempic 0.5 mg weekly-- just took his first dose today. Injects on Thursday. \par \par Blood Glucose Monitoring: target 36%, high 30%, very high 28%\par running high in AM. \par high after breakfast. \par One true low after dinner/ one overnight. \par \par Occupation:, wears boots at work. \par \par Diet - Trying to limit carbohydrate intake.\par 530am breakfast- taking 8 units humalog premeal- sometimes taking right before eating- coffee- two slices of toast. \par lunch: 11am: vegetables/ small portion of rice, sometimes chicken/beef. \par dinner: 6-7pm:  vegetables/ small portion of rice, sometimes chicken/beef. \par \par Exercise,- using exercise bike off and on\par Optho:  Last optho visit was September 2022, Has retinopathy, had laser tx. Has f/u scheduled\par HTN: blood pressure has been controlled. monitors it at home , on ARB. takes entresto, valsartan, hctz, amlodipine\par hyperlipidemia; taking  atorvastatin, but still only intermittently, forgets\par vit d def'y - taking vitamin D

## 2023-01-05 ENCOUNTER — APPOINTMENT (OUTPATIENT)
Dept: ENDOCRINOLOGY | Facility: CLINIC | Age: 68
End: 2023-01-05
Payer: COMMERCIAL

## 2023-01-05 VITALS
HEART RATE: 87 BPM | OXYGEN SATURATION: 99 % | SYSTOLIC BLOOD PRESSURE: 140 MMHG | DIASTOLIC BLOOD PRESSURE: 90 MMHG | WEIGHT: 147 LBS | BODY MASS INDEX: 24.49 KG/M2 | HEIGHT: 65 IN

## 2023-01-05 PROCEDURE — 99214 OFFICE O/P EST MOD 30 MIN: CPT

## 2023-01-05 NOTE — PHYSICAL EXAM
[Alert] : alert [Healthy Appearance] : healthy appearance [No Acute Distress] : no acute distress [Normal Sclera/Conjunctiva] : normal sclera/conjunctiva [No Proptosis] : no proptosis [No LAD] : no lymphadenopathy [Thyroid Not Enlarged] : the thyroid was not enlarged [No Respiratory Distress] : no respiratory distress [Clear to Auscultation] : lungs were clear to auscultation bilaterally [Normal S1, S2] : normal S1 and S2 [Regular Rhythm] : with a regular rhythm [Normal Bowel Sounds] : normal bowel sounds [Not Tender] : non-tender [Soft] : abdomen soft [No Spinal Tenderness] : no spinal tenderness [No Involuntary Movements] : no involuntary movements were seen [Normal Strength/Tone] : muscle strength and tone were normal [Right foot was examined, including] : right foot ~C was examined, including visual inspection with sensory and pulse exams [Left foot was examined, including] : left foot ~C was examined, including visual inspection with sensory and pulse exams [Normal Reflexes] : deep tendon reflexes were 2+ and symmetric [No Tremors] : no tremors [Normal Sensation on Monofilament Testing] : normal sensation on monofilament testing of lower extremities [Normal Affect] : the affect was normal [Normal Mood] : the mood was normal [Foot Ulcers] : no foot ulcers [de-identified] : tr LE edemia; decreased DP pulses [de-identified] : + callus, dry skin

## 2023-01-05 NOTE — ASSESSMENT
[FreeTextEntry1] : 67 year old man with T2DM, uncontrolled, \par \par 1. Type 2 DM:\par Blood Glucose Monitoring: Limited data, no lows. \par \par Plan:\par - Semglee 15 units\par -Continue Humalog 8 units pre-meal -- dose consistently, keep and bring a log of any missed doses. \par -Continue Ozempic 0.5 mg weekly-- just took his first dose today. Injects on Thursday. --> increase to 1mg weekly. \par Submit for dexcom, dexcom compatible with patients phone, Shanon 3 is not compatible. \par \par -RTC Yu one month\par - PT with low c-peptide so will avoid sglt2.  \par -retinopathy care up to date\par -encouraged pt  to increase exercise\par \par 2. HTN:  On ARB.  - urine microalbumin +    -Blood pressure improved/ near goal. \par 3. Hyperlipidemia: encouraged pt to take atorvastatin daily. \par 4. Vitamin D deficiency: - continue vitamin D supplementation, \par

## 2023-01-05 NOTE — HISTORY OF PRESENT ILLNESS
[FreeTextEntry1] : cc: diabetes\par \par 67 year old man with T2DM, uncontrolled.  \par \par Current Regimen:\par - Semglee 15 units\par -Continue Humalog 8 units pre-meal -- dose consistently, keep and bring a log of any missed doses. \par -Continue Ozempic 0.5 mg weekly-- just took his first dose today. Injects on Thursday. \par \par Blood Glucose Monitoring:\par Difficult to assess Shanon, not scanning, I had sent Shanon 3, but he got it, and then the pharmacist told him he can't use it with the reader he has.. which was incorrect information as we discussed he would use his phone as the reader.\par \par Occupation:, wears boots at work. \par \par Diet - Trying to limit carbohydrate intake.\par 530am breakfast- taking 8 units humalog premeal- sometimes taking right before eating- coffee- two slices of toast. \par lunch: 11am: vegetables/ small portion of rice, sometimes chicken/beef. \par dinner: 6-7pm:  vegetables/ small portion of rice, sometimes chicken/beef. \par \par Exercise,- using exercise bike off and on\par Optho:  Last optho visit was September 2022, Has retinopathy, had laser tx. Has f/u scheduled\par HTN: blood pressure has been controlled. monitors it at home , on ARB. takes entresto, valsartan, hctz, amlodipine\par hyperlipidemia; taking  atorvastatin, but still only intermittently, forgets\par vit d def'y - taking vitamin D

## 2023-01-17 ENCOUNTER — RX RENEWAL (OUTPATIENT)
Age: 68
End: 2023-01-17

## 2023-02-21 ENCOUNTER — RX RENEWAL (OUTPATIENT)
Age: 68
End: 2023-02-21

## 2023-03-14 ENCOUNTER — APPOINTMENT (OUTPATIENT)
Dept: ENDOCRINOLOGY | Facility: CLINIC | Age: 68
End: 2023-03-14
Payer: COMMERCIAL

## 2023-03-14 VITALS
BODY MASS INDEX: 22.66 KG/M2 | DIASTOLIC BLOOD PRESSURE: 78 MMHG | HEIGHT: 65 IN | HEART RATE: 72 BPM | OXYGEN SATURATION: 98 % | WEIGHT: 136 LBS | SYSTOLIC BLOOD PRESSURE: 120 MMHG

## 2023-03-14 LAB — HBA1C MFR BLD HPLC: 9.6

## 2023-03-14 PROCEDURE — 83036 HEMOGLOBIN GLYCOSYLATED A1C: CPT | Mod: QW

## 2023-03-14 PROCEDURE — 95251 CONT GLUC MNTR ANALYSIS I&R: CPT

## 2023-03-14 PROCEDURE — 99214 OFFICE O/P EST MOD 30 MIN: CPT | Mod: 25

## 2023-03-14 RX ORDER — ROSUVASTATIN CALCIUM 10 MG/1
10 TABLET, FILM COATED ORAL
Qty: 90 | Refills: 3 | Status: ACTIVE | COMMUNITY
Start: 2023-03-14 | End: 1900-01-01

## 2023-03-14 NOTE — HISTORY OF PRESENT ILLNESS
[FreeTextEntry1] : cc: diabetes\par \par 67 year old man with T2DM, uncontrolled.   Using rubin CGM,  Values have been somewhat variable. . Taking humalog 6 units before each meal.  Semglee 16   Has had GI side effects with metformin .  Taking Ozempic 1.0  mg weekly\par Diet - Trying to limit carbohydrate intake\par Exercise,- walking and riding bike\par  Last optho visit was September 2022, Has retinopathy, had laser tx. missed appt, needs to reschedule\par \par HTN: blood pressure has been controlled. on ARB. takes entresto, hctz, amlodipine\par \par hyperlipidemia; taking  atorvastatin, but still only intermittently, feels nauseous\par \par vit d def'y - taking vitamin D \par \par

## 2023-03-14 NOTE — ASSESSMENT
[FreeTextEntry1] : 66 year old man with T2DM, uncontrolled, \par  - CGm downloaded and reviewed.  Pt 57% TIR<, 5% hypoglycemia.  Pt with postprandial hyperglycemia, occasional hypoglycemia between meals.  Increase humalog to 7 units  Decrease semglee t0 14 units daily.  \par - Increase ozempic to 2 mg\par  - download CGm in 2 weeks\par - PT with low c-peptide, avoiding sglt2.  \par   -  +retinopathy due for f/u\par  - encouraged pt  to increase exercise\par  - had  covid vaccine\par  - had flu shot \par  - check cmp\par \par HTN: blood pressure at goal, continue current regimen.  On ARB.\par  - check urine microalbumin \par \par Hyperlipidemia: will switch to rosuvastatin, see if better tolerated, check lipids next visit\par \par vit d def'y - continue vitamin D supplementation, \par \par \par \par f/u with np in 1 month and md in 4 months

## 2023-03-15 LAB
ALBUMIN SERPL ELPH-MCNC: 4.3 G/DL
ALP BLD-CCNC: 108 U/L
ALT SERPL-CCNC: 16 U/L
ANION GAP SERPL CALC-SCNC: 15 MMOL/L
AST SERPL-CCNC: 13 U/L
BILIRUB SERPL-MCNC: 0.8 MG/DL
BUN SERPL-MCNC: 18 MG/DL
CALCIUM SERPL-MCNC: 9.9 MG/DL
CHLORIDE SERPL-SCNC: 101 MMOL/L
CO2 SERPL-SCNC: 26 MMOL/L
CREAT SERPL-MCNC: 1.14 MG/DL
CREAT SPEC-SCNC: 222 MG/DL
EGFR: 70 ML/MIN/1.73M2
GLUCOSE SERPL-MCNC: 115 MG/DL
MICROALBUMIN 24H UR DL<=1MG/L-MCNC: 11.6 MG/DL
MICROALBUMIN/CREAT 24H UR-RTO: 52 MG/G
POTASSIUM SERPL-SCNC: 4.4 MMOL/L
PROT SERPL-MCNC: 6.9 G/DL
SODIUM SERPL-SCNC: 142 MMOL/L

## 2023-05-04 RX ORDER — SEMAGLUTIDE 2.68 MG/ML
8 INJECTION, SOLUTION SUBCUTANEOUS
Qty: 3 | Refills: 3 | Status: ACTIVE | COMMUNITY
Start: 2022-06-29 | End: 1900-01-01

## 2023-05-16 ENCOUNTER — APPOINTMENT (OUTPATIENT)
Dept: ENDOCRINOLOGY | Facility: CLINIC | Age: 68
End: 2023-05-16
Payer: COMMERCIAL

## 2023-05-16 VITALS
OXYGEN SATURATION: 97 % | HEART RATE: 79 BPM | WEIGHT: 141 LBS | BODY MASS INDEX: 23.49 KG/M2 | DIASTOLIC BLOOD PRESSURE: 71 MMHG | HEIGHT: 65 IN | SYSTOLIC BLOOD PRESSURE: 120 MMHG

## 2023-05-16 PROCEDURE — 99214 OFFICE O/P EST MOD 30 MIN: CPT

## 2023-05-16 NOTE — ASSESSMENT
[FreeTextEntry1] : 67 year old man with T2DM, uncontrolled, \par \par Type 2 DM:\par   Plan:\par Continue Ozempic 2 mg once weekly\par Continue Semglee 14 units daily\par Continue Humalog 7 units, change dosing to 10 to 15 minutes prior to meals.\par   Retrial metformin 500 mg ER once daily.\par \par There is not enough rubin data to make definitive treatment decisions today.  So I will bring him back in a week and he will scan every 8 hours, and we will make changes at that visit.\par \par - PT with low c-peptide, avoiding sglt2.  \par   -  +retinopathy due for f/u\par  - encouraged pt  to increase exercise\par \par HTN: blood pressure at goal, continue current regimen.  On ARB.\par  - check urine microalbumin \par \par Hyperlipidemia:   Statin was changed to rosuvastatin.\par \par   Vitamin D deficiency - continue vitamin D supplementation, \par \par

## 2023-05-16 NOTE — DATA REVIEWED
[FreeTextEntry1] : 6/2022\par \par egfr 80\par TSH 2.98\par cir d 47\par \par \par 7/2020\par hba1c 10.3\par k+ 4.0\par \par Cr 1.03\par egfr 88\par TSH 1.03\par \par 4/2019\par cr 1.05\par eGFR 87\par \par LDL 94\par \par Hba1c 9.6\par \par 1/2019\par cr 0.9\par \par

## 2023-05-16 NOTE — HISTORY OF PRESENT ILLNESS
[FreeTextEntry1] : cc: diabetes\par \par 67 year old man with T2DM, uncontrolled.   Using rubin CGM,  Values have been somewhat variable. \par We tried to send rubin 3 previously but it was not compatible with the patient's phone, and there is not yet a reader for the rubin 3.\par \par current Medications:\par - Ozempic to 2 mg\par -Semglee 14 units\par -Humalog 7 units pre-meals-- taking right at the meal. \par \par Previous Medications:\par Was on Metformin- did not tolerate. \par \par \par Diet - Trying to limit carbohydrate intake\par Exercise,- walking and riding bike\par  Last optho visit was September 2022, Has retinopathy, had laser tx. missed appt, needs to reschedule\par \par HTN: blood pressure has been controlled. on ARB. takes entresto, hctz, amlodipine\par \par hyperlipidemia; taking  atorvastatin, but still only intermittently, feels nauseous\par \par vit d def'y - taking vitamin D

## 2023-05-26 ENCOUNTER — APPOINTMENT (OUTPATIENT)
Dept: ENDOCRINOLOGY | Facility: CLINIC | Age: 68
End: 2023-05-26
Payer: COMMERCIAL

## 2023-05-26 VITALS
OXYGEN SATURATION: 97 % | BODY MASS INDEX: 23.66 KG/M2 | HEIGHT: 65 IN | HEART RATE: 94 BPM | DIASTOLIC BLOOD PRESSURE: 70 MMHG | SYSTOLIC BLOOD PRESSURE: 110 MMHG | WEIGHT: 142 LBS

## 2023-05-26 PROCEDURE — 99214 OFFICE O/P EST MOD 30 MIN: CPT

## 2023-05-26 NOTE — HISTORY OF PRESENT ILLNESS
[FreeTextEntry1] : cc: diabetes\par \par 67 year old man with T2DM, uncontrolled.   Using shanon CGM,  Values have been somewhat variable. \par We tried to send shanon 3 previously but it was not compatible with the patient's phone, and there is not yet a reader for the shanon 3.\par \par current Medications:\par - Ozempic 2 mg\par -Semglee 14 units\par -Humalog 7 units pre-meals\par Metformin 500mg daily with lunch\par \par Previous Medications:\par Was on Metformin- did not tolerate. \par \par Shanon is downloaded and interpreted as follows: The patient is missing scans between 10 to 11 AM and 4 PM, he has a very busy work schedule so a lot of the time he is unable to scan consistently.\par On the days where we have consistent data, such as May 16 overall the glucose is relatively stable.  However there is marked variation in glucose readings the patient is often hyper glycemic into the 300s on Monday, on other days he might have a low into the 60s to 80s.\par \par Diet - Trying to limit carbohydrate intake\par Exercise,- walking and riding bike\par  Last optho visit was September 2022, Has retinopathy, had laser tx. missed appt, needs to reschedule\par \par HTN: blood pressure has been controlled. on ARB. takes entresto, hctz, amlodipine\par \par hyperlipidemia; taking  atorvastatin, but still only intermittently, feels nauseous\par \par vit d def'y - taking vitamin D

## 2023-05-26 NOTE — ASSESSMENT
[FreeTextEntry1] : 67 year old man with T2DM, uncontrolled, \par \par Type 2 DM:\par   Plan:\par Continue Ozempic 2 mg once weekly\par Continue Semglee 14 units daily\par Decrease Humalog to 5 units before meals. \par Increase metformin 500 mg ER one tablet with lunch and one tablet with dinner. \par [place], [place] 2 weeks later. \par \par - Patient with low c-peptide, avoiding sglt2.  \par   -  +retinopathy due for f/u\par  - encouraged pt  to increase exercise\par \par HTN: blood pressure at goal, continue current regimen.  On ARB.\par  - check urine microalbumin \par \par Hyperlipidemia:   Statin was changed to rosuvastatin.\par \par   Vitamin D deficiency - continue vitamin D supplementation, \par \par

## 2023-07-18 ENCOUNTER — APPOINTMENT (OUTPATIENT)
Dept: ENDOCRINOLOGY | Facility: CLINIC | Age: 68
End: 2023-07-18

## 2023-08-14 ENCOUNTER — RX RENEWAL (OUTPATIENT)
Age: 68
End: 2023-08-14

## 2023-08-14 RX ORDER — METFORMIN ER 500 MG 500 MG/1
500 TABLET ORAL DAILY
Qty: 180 | Refills: 0 | Status: ACTIVE | COMMUNITY
Start: 2023-05-16 | End: 1900-01-01

## 2023-08-15 ENCOUNTER — APPOINTMENT (OUTPATIENT)
Dept: ENDOCRINOLOGY | Facility: CLINIC | Age: 68
End: 2023-08-15

## 2023-08-15 NOTE — DATA REVIEWED
Visit Information Date & Time Provider Department Dept. Phone Encounter #  
 2/3/2017  1:30 PM Donovan Barrett MD [facility] [phone] [id number] Follow-up Instructions Return in about 1 month (around 3/3/2017) for asthma follow up. Your Appointments 5/11/2017 10:00 AM  
ROUTINE CARE with Donovan Barrett MD  
[facility]) Appt Note: 6 month asthma f/u  
 [id number] [facility] [address] [place] [address]  
[phone]  
  
   
 [address] [place] [address] Upcoming Health Maintenance Date Due  
 BREAST CANCER SCRN MAMMOGRAM 3/29/2003 FOBT Q 1 YEAR AGE 50-75 3/29/2003 PAP AKA CERVICAL CYTOLOGY 12/6/2017 DTaP/Tdap/Td series (2 - Td) 11/11/2026 Allergies as of 2/3/2017  Review Complete On: 2/3/2017 By: Donovan Barrett MD  
  
 Severity Noted Reaction Type Reactions Amoxicillin  12/15/2015    Unknown (comments) Current Immunizations  Reviewed on 1/17/2017 Name Date Influenza Vaccine 9/4/2010 Influenza Vaccine (Quad) PF 12/6/2016 Pneumococcal Polysaccharide (PPSV-23) 12/6/2016 Not reviewed this visit You Were Diagnosed With   
  
 Codes Comments Asthma, chronic, moderate persistent, uncomplicated     RAJIV-14-VV: J45.40 ICD-9-CM: 493.90 Vitals BP Pulse Temp Weight(growth percentile) SpO2 PF  
 120/50 (BP 1 Location: Left arm, BP Patient Position: Sitting) (!) 57 98.4 °F (36.9 °C) 117 lb (53.1 kg) 97% 300 L/min BMI OB Status Smoking Status 22.11 kg/m2 Menopause Current Every Day Smoker Vitals History BMI and BSA Data Body Mass Index Body Surface Area  
 22.11 kg/m 2 1.51 m 2 Preferred Pharmacy Pharmacy Name Phone [id number] - [place] [address] [phone] Your Updated Medication List  
  
   
 This list is accurate as of: 2/3/17  1:58 PM.  Always use your most recent med list.  
  
  
  
  
 * albuterol 90 mcg/actuation inhaler Commonly known as:  PROVENTIL HFA, VENTOLIN HFA, PROAIR HFA Take 2 Puffs by inhalation every four (4) hours as needed for Wheezing or Shortness of Breath. * albuterol 2.5 mg /3 mL (0.083 %) nebulizer solution Commonly known as:  PROVENTIL VENTOLIN  
3 mL by Nebulization route every four (4) hours as needed for Wheezing. amLODIPine 10 mg tablet Commonly known as:  Izetta Harder Take 1 Tab by mouth daily. atorvastatin 40 mg tablet Commonly known as:  LIPITOR Take 1 Tab by mouth daily. beclomethasone 80 mcg/actuation inhaler Commonly known as:  QVAR Take 1 Puff by inhalation two (2) times a day. brimonidine 0.15 % ophthalmic solution Commonly known as:  Jerryl Scott Instill  in eye.  
  
 cetirizine 10 mg tablet Commonly known as:  ZYRTEC Take 1 Tab by mouth daily. inhalational spacing device Use as directed with albuterol inhaler. latanoprost 0.005 % ophthalmic solution Commonly known as:  Anival Pry Instill  in eye. * Notice: This list has 2 medication(s) that are the same as other medications prescribed for you. Read the directions carefully, and ask your doctor or other care provider to review them with you. Prescriptions Sent to Pharmacy Refills  
 beclomethasone (QVAR) 80 mcg/actuation inhaler 1 Sig: Take 1 Puff by inhalation two (2) times a day. Class: Normal  
 Pharmacy: [address] #: [phone] Route: Inhalation Follow-up Instructions Return in about 1 month (around 3/3/2017) for asthma follow up. Introducing \[facility]\"" & HEALTH SERVICES! Dear Hakeem Nicholas: Thank you for requesting a [employer] account.   Our records indicate that you have previously registered for a [employer] account but its currently [FreeTextEntry1] : 6/2022\par  \par  egfr 80\par  TSH 2.98\par  cir d 47\par  \par  \par  7/2020\par  hba1c 10.3\par  k+ 4.0\par  \par  Cr 1.03\par  egfr 88\par  TSH 1.03\par  \par  4/2019\par  cr 1.05\par  eGFR 87\par  \par  LDL 94\par  \par  Hba1c 9.6\par  \par  1/2019\par  cr 0.9\par  \par   inactive. Please call our [employer] support line at [phone]. Additional Information If you have questions, please visit the Frequently Asked Questions section of the [employer] website at [email or web address] Remember, [employer] is NOT to be used for urgent needs. For medical emergencies, dial 911. Now available from your iPhone and Android! Please provide this summary of care documentation to your next provider. Your primary care clinician is listed as Ricardo Carrington. If you have any questions after today's visit, please call [phone].

## 2023-08-15 NOTE — ASSESSMENT
[FreeTextEntry1] : 66 year old man with T2DM, uncontrolled,   - CGm downloaded and reviewed.     Pt 57% TIR<, 5% hypoglycemia.  Pt with postprandial hyperglycemia, occasional hypoglycemia between meals.  Increase humalog to 7 units  Decrease semglee t0 14 units daily.   - continue ozempic to 2 mg  - download CGm in 2 weeks - PT with low c-peptide, avoiding sglt2.     -  +retinopathy due for f/u  - encouraged pt  to increase exercise  - check cmp  HTN: blood pressure at goal, continue current regimen.  On ARB.  - urine microalbumin pos  Hyperlipidemia: on rosuvastatin, , check lipids   vit d def'y - continue vitamin D supplementation,     f/u with np in 1 month and md in 4 months

## 2023-08-15 NOTE — HISTORY OF PRESENT ILLNESS
[FreeTextEntry1] : cc: diabetes  68 year old man with T2DM, uncontrolled.   Using rubin CGM,  Values have been somewhat variable. . Taking humalog 6 units before each meal.  Semglee 16   Has had GI side effects with metformin .  Taking Ozempic 2.0  mg weekly Diet - Trying to limit carbohydrate intake Exercise,- walking and riding bike  Last optho visit was September 2022, Has retinopathy, had laser tx.  HTN: blood pressure has been controlled. on ARB. takes entresto, hctz, amlodipine  hyperlipidemia; taking  atorvastatin, but still only intermittently, feels nauseous  vit d def'y - taking vitamin D

## 2023-09-25 ENCOUNTER — RX RENEWAL (OUTPATIENT)
Age: 68
End: 2023-09-25

## 2023-10-09 RX ORDER — FLASH GLUCOSE SENSOR
KIT MISCELLANEOUS
Qty: 6 | Refills: 3 | Status: ACTIVE | COMMUNITY
Start: 2018-07-12 | End: 1900-01-01

## 2023-10-12 ENCOUNTER — APPOINTMENT (OUTPATIENT)
Dept: ENDOCRINOLOGY | Facility: CLINIC | Age: 68
End: 2023-10-12
Payer: COMMERCIAL

## 2023-10-12 VITALS
BODY MASS INDEX: 22.16 KG/M2 | SYSTOLIC BLOOD PRESSURE: 136 MMHG | OXYGEN SATURATION: 99 % | HEART RATE: 70 BPM | HEIGHT: 65 IN | DIASTOLIC BLOOD PRESSURE: 84 MMHG | WEIGHT: 133 LBS

## 2023-10-12 DIAGNOSIS — E11.649 TYPE 2 DIABETES MELLITUS WITH HYPOGLYCEMIA W/OUT COMA: ICD-10-CM

## 2023-10-12 LAB
GLUCOSE BLDC GLUCOMTR-MCNC: 196
HBA1C MFR BLD HPLC: 11

## 2023-10-12 PROCEDURE — 90662 IIV NO PRSV INCREASED AG IM: CPT

## 2023-10-12 PROCEDURE — 95251 CONT GLUC MNTR ANALYSIS I&R: CPT

## 2023-10-12 PROCEDURE — 99214 OFFICE O/P EST MOD 30 MIN: CPT | Mod: 25

## 2023-10-12 PROCEDURE — 82962 GLUCOSE BLOOD TEST: CPT

## 2023-10-12 PROCEDURE — 83036 HEMOGLOBIN GLYCOSYLATED A1C: CPT | Mod: QW

## 2023-10-12 PROCEDURE — G0008: CPT

## 2024-01-02 ENCOUNTER — RX RENEWAL (OUTPATIENT)
Age: 69
End: 2024-01-02

## 2024-01-02 ENCOUNTER — APPOINTMENT (OUTPATIENT)
Dept: ENDOCRINOLOGY | Facility: CLINIC | Age: 69
End: 2024-01-02

## 2024-01-02 RX ORDER — PEN NEEDLE, DIABETIC 32GX 5/32"
32G X 4 MM NEEDLE, DISPOSABLE MISCELLANEOUS
Qty: 400 | Refills: 2 | Status: ACTIVE | COMMUNITY
Start: 2024-01-02 | End: 1900-01-01

## 2024-01-09 RX ORDER — INSULIN GLARGINE-YFGN 100 [IU]/ML
100 INJECTION, SOLUTION SUBCUTANEOUS
Qty: 1 | Refills: 3 | Status: ACTIVE | COMMUNITY
Start: 2022-11-02 | End: 1900-01-01

## 2024-02-01 ENCOUNTER — APPOINTMENT (OUTPATIENT)
Dept: ENDOCRINOLOGY | Facility: CLINIC | Age: 69
End: 2024-02-01
Payer: COMMERCIAL

## 2024-02-01 VITALS
OXYGEN SATURATION: 98 % | WEIGHT: 135 LBS | HEART RATE: 92 BPM | HEIGHT: 65 IN | SYSTOLIC BLOOD PRESSURE: 124 MMHG | DIASTOLIC BLOOD PRESSURE: 76 MMHG | BODY MASS INDEX: 22.49 KG/M2

## 2024-02-01 DIAGNOSIS — R80.9 TYPE 2 DIABETES MELLITUS WITH OTHER DIABETIC KIDNEY COMPLICATION: ICD-10-CM

## 2024-02-01 DIAGNOSIS — E11.29 TYPE 2 DIABETES MELLITUS WITH OTHER DIABETIC KIDNEY COMPLICATION: ICD-10-CM

## 2024-02-01 DIAGNOSIS — Z79.4 TYPE 2 DIABETES MELLITUS WITH OTHER DIABETIC KIDNEY COMPLICATION: ICD-10-CM

## 2024-02-01 DIAGNOSIS — E55.9 VITAMIN D DEFICIENCY, UNSPECIFIED: ICD-10-CM

## 2024-02-01 DIAGNOSIS — E78.5 HYPERLIPIDEMIA, UNSPECIFIED: ICD-10-CM

## 2024-02-01 DIAGNOSIS — I10 ESSENTIAL (PRIMARY) HYPERTENSION: ICD-10-CM

## 2024-02-01 LAB — HBA1C MFR BLD HPLC: 9.4

## 2024-02-01 PROCEDURE — 99214 OFFICE O/P EST MOD 30 MIN: CPT

## 2024-02-01 PROCEDURE — 83036 HEMOGLOBIN GLYCOSYLATED A1C: CPT | Mod: QW

## 2024-02-01 NOTE — HISTORY OF PRESENT ILLNESS
[FreeTextEntry1] : cc: diabetes  68 year old man with T2DM, uncontrolled.   Using rubin CGM,  Values have improved.  Generally in range in the morning, during the day it depends on what he eats.   Has had hypoglycemia after he has a snack at night and takes 8 units of humalog, CGM wakes him up and he drinks juice.  No hypoglycemia during the day.  No severe hypoglycemia.  Taking humalog 8 units before each meal.  Semglee 16   Has had GI side effects with metformin .  Taking Ozempic 2.0  mg weekly Diet - Trying to limit carbohydrate intake.  Has lost weight on ozempic, recently stable Exercise,- walking and riding bike intermittently  Last optho visit was January 2023, Has retinopathy, had laser tx. Stable  HTN: blood pressure has been controlled. takes entresto, hctz, amlodipine and Farxiga   Hyperlipidemia; taking  rosuvastatin,   Vit d def'y - taking vitamin D

## 2024-02-01 NOTE — PHYSICAL EXAM
[Alert] : alert [Healthy Appearance] : healthy appearance [No Acute Distress] : no acute distress [Normal Sclera/Conjunctiva] : normal sclera/conjunctiva [No Proptosis] : no proptosis [No LAD] : no lymphadenopathy [Thyroid Not Enlarged] : the thyroid was not enlarged [No Respiratory Distress] : no respiratory distress [Clear to Auscultation] : lungs were clear to auscultation bilaterally [Normal S1, S2] : normal S1 and S2 [Regular Rhythm] : with a regular rhythm [No Edema] : no peripheral edema [Pedal Pulses Normal] : the pedal pulses are present [Normal Bowel Sounds] : normal bowel sounds [Not Tender] : non-tender [Soft] : abdomen soft [No Spinal Tenderness] : no spinal tenderness [No Involuntary Movements] : no involuntary movements were seen [Normal Strength/Tone] : muscle strength and tone were normal [Foot Ulcers] : no foot ulcers [Right foot was examined, including] : right foot ~C was examined, including visual inspection with sensory and pulse exams [Left foot was examined, including] : left foot ~C was examined, including visual inspection with sensory and pulse exams [Normal Reflexes] : deep tendon reflexes were 2+ and symmetric [No Tremors] : no tremors [Normal Sensation on Monofilament Testing] : normal sensation on monofilament testing of lower extremities [Normal Affect] : the affect was normal [Normal Mood] : the mood was normal

## 2024-02-01 NOTE — ASSESSMENT
[FreeTextEntry1] : 68 year old man with T2DM, uncontrolled,  - Did not bring CGM reader today, will bring for review.  A1c above goal.  -Discussed again switching to closed loop insulin pump.  He is amenable.  States insurance denied pump.  Will re-attempt.  For now, continue current regimen.  Will adjust regimen based on review of CGM - COnitnue ozempic  2 mg - PT with low c-peptide, started on  sglt2 by cardiology.  Counseled on risk and symptoms of DKA.  Will prescribe ketone strips  - +retinopathy due for f/u  - encouraged pt to increase exercise  -had  flu shot  - check cmp   HTN: blood pressure at goal, continue current regimen. On ARB and sglt2.  - check urine microalbumin   Hyperlipidemia: continue rosuvastatin,  vit d def'y - continue vitamin D supplementation,   f/u for pump training and with md in 4 months.

## 2024-02-01 NOTE — DATA REVIEWED
[FreeTextEntry1] : 7/2023 LDL 68 cr 0.88 A1c 8.5  TSH 1.81 vit d 36  6/2022  egfr 80 TSH 2.98 cir d 47   7/2020 hba1c 10.3 k+ 4.0  Cr 1.03 egfr 88 TSH 1.03  4/2019 cr 1.05 eGFR 87  LDL 94  Hba1c 9.6  1/2019 cr 0.9

## 2024-02-08 LAB
ALBUMIN SERPL ELPH-MCNC: 4.2 G/DL
ALP BLD-CCNC: 146 U/L
ALT SERPL-CCNC: 29 U/L
ANION GAP SERPL CALC-SCNC: 9 MMOL/L
AST SERPL-CCNC: 15 U/L
BILIRUB SERPL-MCNC: 1.5 MG/DL
BUN SERPL-MCNC: 12 MG/DL
CALCIUM SERPL-MCNC: 9.6 MG/DL
CHLORIDE SERPL-SCNC: 98 MMOL/L
CO2 SERPL-SCNC: 30 MMOL/L
CREAT SERPL-MCNC: 1.03 MG/DL
CREAT SPEC-SCNC: 172 MG/DL
EGFR: 79 ML/MIN/1.73M2
GLUCOSE SERPL-MCNC: 363 MG/DL
MICROALBUMIN 24H UR DL<=1MG/L-MCNC: 31 MG/DL
MICROALBUMIN/CREAT 24H UR-RTO: 180 MG/G
POTASSIUM SERPL-SCNC: 5.3 MMOL/L
PROT SERPL-MCNC: 6.4 G/DL
SODIUM SERPL-SCNC: 137 MMOL/L

## 2024-03-01 NOTE — ED PROVIDER NOTE - NS HIV RISK FACTOR YES
Care Transitions Program Weekly Follow-up Call    Current Issues/Problems reviewed on call: pt  report pt is doing fine. Pt is still SOB with activity but is improving. He has no concerns at this time    Details of Interventions/Education completed on call: Discussed scheduling f/u with pcp and the importance. Pt spouse declines at this time.    Review of Systems:   Care Transition System Evaluation:       General Symptoms:    Neurologic/Neuromuscular Symptoms:    ENT Symptoms:    Respiratory Symptoms: Shortness of breath  Shortness of Breath: SOB with mild exertion  Cardiovascular Symptoms: WDL (absence of symptoms)  GI Symptoms:           Symptoms:    Skin Symptoms: Wound     Hours of Uninterrupted Sleep:   Sleeping Behaviors:WDL (absence of symptoms)  Current Lines/Drains:No  Line/Drain Type:   Line/Drain Status:   Description of Line/Drain Concern:   Feeding Tube Type:    Is patient confident in appropriate use and care of line/drain:   Description of patients lack of confidence in use of line/drain:     The patient is taking all medications as prescribed. The patient did not have questions or concerns regarding the medications prescribed.    Transitional Care Management (TCM) appointment was not completed.  TCM appointment plan of care and upcoming appointments were reviewed with patient.  Transportation to upcoming appointments to be provided by .    Next Care Transitions follow-up call will be within 1 week.    Plan for next follow-up call: follow up.   Declined

## 2024-03-25 RX ORDER — FLASH GLUCOSE SENSOR
KIT MISCELLANEOUS
Qty: 6 | Refills: 3 | Status: ACTIVE | COMMUNITY
Start: 2024-03-25 | End: 1900-01-01

## 2024-06-20 ENCOUNTER — NON-APPOINTMENT (OUTPATIENT)
Age: 69
End: 2024-06-20

## 2024-06-20 ENCOUNTER — APPOINTMENT (OUTPATIENT)
Dept: ENDOCRINOLOGY | Facility: CLINIC | Age: 69
End: 2024-06-20

## 2024-10-02 RX ORDER — FLASH GLUCOSE SCANNING READER
EACH MISCELLANEOUS
Qty: 1 | Refills: 0 | Status: ACTIVE | COMMUNITY
Start: 2024-10-02 | End: 1900-01-01

## 2025-01-15 ENCOUNTER — APPOINTMENT (OUTPATIENT)
Dept: ORTHOPEDIC SURGERY | Facility: CLINIC | Age: 70
End: 2025-01-15
Payer: COMMERCIAL

## 2025-01-15 DIAGNOSIS — G56.01 CARPAL TUNNEL SYNDROME, RIGHT UPPER LIMB: ICD-10-CM

## 2025-01-15 DIAGNOSIS — E11.42 TYPE 2 DIABETES MELLITUS WITH DIABETIC POLYNEUROPATHY: ICD-10-CM

## 2025-01-15 PROCEDURE — 73130 X-RAY EXAM OF HAND: CPT | Mod: RT

## 2025-01-15 PROCEDURE — 99204 OFFICE O/P NEW MOD 45 MIN: CPT

## 2025-02-24 ENCOUNTER — RX RENEWAL (OUTPATIENT)
Age: 70
End: 2025-02-24

## 2025-03-27 ENCOUNTER — APPOINTMENT (OUTPATIENT)
Facility: CLINIC | Age: 70
End: 2025-03-27
Payer: COMMERCIAL

## 2025-03-27 VITALS
DIASTOLIC BLOOD PRESSURE: 81 MMHG | WEIGHT: 156 LBS | TEMPERATURE: 97.9 F | SYSTOLIC BLOOD PRESSURE: 157 MMHG | HEART RATE: 79 BPM | BODY MASS INDEX: 25.99 KG/M2 | HEIGHT: 65 IN

## 2025-03-27 DIAGNOSIS — R80.9 TYPE 2 DIABETES MELLITUS WITH OTHER DIABETIC KIDNEY COMPLICATION: ICD-10-CM

## 2025-03-27 DIAGNOSIS — E55.9 VITAMIN D DEFICIENCY, UNSPECIFIED: ICD-10-CM

## 2025-03-27 DIAGNOSIS — E11.29 TYPE 2 DIABETES MELLITUS WITH OTHER DIABETIC KIDNEY COMPLICATION: ICD-10-CM

## 2025-03-27 DIAGNOSIS — E78.5 HYPERLIPIDEMIA, UNSPECIFIED: ICD-10-CM

## 2025-03-27 DIAGNOSIS — I10 ESSENTIAL (PRIMARY) HYPERTENSION: ICD-10-CM

## 2025-03-27 DIAGNOSIS — Z79.4 TYPE 2 DIABETES MELLITUS WITH OTHER DIABETIC KIDNEY COMPLICATION: ICD-10-CM

## 2025-03-27 LAB — HBA1C MFR BLD HPLC: 12.2

## 2025-03-27 PROCEDURE — 83036 HEMOGLOBIN GLYCOSYLATED A1C: CPT | Mod: QW

## 2025-03-27 PROCEDURE — 99214 OFFICE O/P EST MOD 30 MIN: CPT

## 2025-03-27 PROCEDURE — G2211 COMPLEX E/M VISIT ADD ON: CPT | Mod: NC

## 2025-03-28 LAB
ALBUMIN SERPL ELPH-MCNC: 4.1 G/DL
ALP BLD-CCNC: 99 U/L
ALT SERPL-CCNC: 14 U/L
ANION GAP SERPL CALC-SCNC: 10 MMOL/L
AST SERPL-CCNC: 16 U/L
BILIRUB SERPL-MCNC: 0.7 MG/DL
BUN SERPL-MCNC: 17 MG/DL
CALCIUM SERPL-MCNC: 9.1 MG/DL
CHLORIDE SERPL-SCNC: 107 MMOL/L
CHOLEST SERPL-MCNC: 225 MG/DL
CO2 SERPL-SCNC: 27 MMOL/L
CREAT SERPL-MCNC: 0.96 MG/DL
CREAT SPEC-SCNC: 224 MG/DL
EGFRCR SERPLBLD CKD-EPI 2021: 85 ML/MIN/1.73M2
GLUCOSE SERPL-MCNC: 106 MG/DL
HDLC SERPL-MCNC: 68 MG/DL
LDLC SERPL-MCNC: 138 MG/DL
MICROALBUMIN 24H UR DL<=1MG/L-MCNC: 30.5 MG/DL
MICROALBUMIN/CREAT 24H UR-RTO: 136 MG/G
NONHDLC SERPL-MCNC: 157 MG/DL
POTASSIUM SERPL-SCNC: 4 MMOL/L
PROT SERPL-MCNC: 6.7 G/DL
SODIUM SERPL-SCNC: 144 MMOL/L
TRIGL SERPL-MCNC: 104 MG/DL

## 2025-04-01 RX ORDER — INSULIN PMP CART,AUT,G6/7,CNTR
EACH SUBCUTANEOUS
Qty: 6 | Refills: 3 | Status: ACTIVE | COMMUNITY
Start: 2025-04-01 | End: 1900-01-01

## 2025-04-01 RX ORDER — INSULIN PMP CART,AUT,G6/7,CNTR
EACH SUBCUTANEOUS
Qty: 1 | Refills: 0 | Status: ACTIVE | COMMUNITY
Start: 2025-04-01 | End: 1900-01-01

## 2025-05-27 ENCOUNTER — RX RENEWAL (OUTPATIENT)
Age: 70
End: 2025-05-27

## 2025-06-13 ENCOUNTER — RX RENEWAL (OUTPATIENT)
Age: 70
End: 2025-06-13

## 2025-06-18 ENCOUNTER — APPOINTMENT (OUTPATIENT)
Dept: ENDOCRINOLOGY | Facility: CLINIC | Age: 70
End: 2025-06-18

## 2025-06-19 ENCOUNTER — APPOINTMENT (OUTPATIENT)
Dept: INTERNAL MEDICINE | Facility: CLINIC | Age: 70
End: 2025-06-19

## 2025-06-19 PROCEDURE — P0019: CPT

## 2025-06-23 ENCOUNTER — APPOINTMENT (OUTPATIENT)
Dept: INTERNAL MEDICINE | Facility: CLINIC | Age: 70
End: 2025-06-23
Payer: COMMERCIAL

## 2025-06-23 PROCEDURE — 98960 EDU&TRN PT SELF-MGMT NQHP 1: CPT

## 2025-06-25 RX ORDER — INSULIN LISPRO 100 [IU]/ML
100 INJECTION, SOLUTION INTRAVENOUS; SUBCUTANEOUS
Qty: 90 | Refills: 3 | Status: ACTIVE | COMMUNITY
Start: 2025-06-25 | End: 1900-01-01

## 2025-06-25 RX ORDER — INSULIN LISPRO 100 [IU]/ML
100 INJECTION, SOLUTION INTRAVENOUS; SUBCUTANEOUS
Qty: 10 | Refills: 3 | Status: DISCONTINUED | COMMUNITY
Start: 2025-06-19 | End: 2025-06-25

## 2025-07-08 ENCOUNTER — APPOINTMENT (OUTPATIENT)
Facility: CLINIC | Age: 70
End: 2025-07-08
Payer: COMMERCIAL

## 2025-07-08 VITALS
BODY MASS INDEX: 25.16 KG/M2 | SYSTOLIC BLOOD PRESSURE: 167 MMHG | HEIGHT: 65 IN | DIASTOLIC BLOOD PRESSURE: 91 MMHG | WEIGHT: 151 LBS | HEART RATE: 68 BPM

## 2025-07-08 VITALS — SYSTOLIC BLOOD PRESSURE: 150 MMHG | DIASTOLIC BLOOD PRESSURE: 90 MMHG

## 2025-07-08 LAB — HBA1C MFR BLD HPLC: 11.4

## 2025-07-08 PROCEDURE — G2211 COMPLEX E/M VISIT ADD ON: CPT | Mod: NC

## 2025-07-08 PROCEDURE — 99214 OFFICE O/P EST MOD 30 MIN: CPT

## 2025-07-08 PROCEDURE — 83036 HEMOGLOBIN GLYCOSYLATED A1C: CPT | Mod: QW

## 2025-07-09 LAB
CHOLEST SERPL-MCNC: 217 MG/DL
HDLC SERPL-MCNC: 68 MG/DL
LDLC SERPL-MCNC: 133 MG/DL
NONHDLC SERPL-MCNC: 149 MG/DL
TRIGL SERPL-MCNC: 90 MG/DL

## 2025-07-23 ENCOUNTER — APPOINTMENT (OUTPATIENT)
Dept: INTERNAL MEDICINE | Facility: CLINIC | Age: 70
End: 2025-07-23
Payer: COMMERCIAL

## 2025-07-23 PROCEDURE — G0108 DIAB MANAGE TRN  PER INDIV: CPT

## 2025-07-23 PROCEDURE — 95249 CONT GLUC MNTR PT PROV EQP: CPT

## 2025-07-28 ENCOUNTER — APPOINTMENT (OUTPATIENT)
Dept: INTERNAL MEDICINE | Facility: CLINIC | Age: 70
End: 2025-07-28
Payer: COMMERCIAL

## 2025-07-28 PROCEDURE — 98960 EDU&TRN PT SELF-MGMT NQHP 1: CPT

## 2025-08-18 ENCOUNTER — APPOINTMENT (OUTPATIENT)
Dept: INTERNAL MEDICINE | Facility: CLINIC | Age: 70
End: 2025-08-18
Payer: COMMERCIAL

## 2025-08-18 PROCEDURE — 95251 CONT GLUC MNTR ANALYSIS I&R: CPT

## 2025-08-18 PROCEDURE — G0108 DIAB MANAGE TRN  PER INDIV: CPT

## 2025-09-09 ENCOUNTER — APPOINTMENT (OUTPATIENT)
Dept: INTERNAL MEDICINE | Facility: CLINIC | Age: 70
End: 2025-09-09

## 2025-09-09 PROCEDURE — 95251 CONT GLUC MNTR ANALYSIS I&R: CPT

## 2025-09-09 PROCEDURE — 98960 EDU&TRN PT SELF-MGMT NQHP 1: CPT

## 2025-09-18 ENCOUNTER — APPOINTMENT (OUTPATIENT)
Dept: INTERNAL MEDICINE | Facility: CLINIC | Age: 70
End: 2025-09-18